# Patient Record
Sex: FEMALE | Race: WHITE | Employment: OTHER | URBAN - METROPOLITAN AREA
[De-identification: names, ages, dates, MRNs, and addresses within clinical notes are randomized per-mention and may not be internally consistent; named-entity substitution may affect disease eponyms.]

---

## 2017-02-07 PROBLEM — I25.10 CORONARY ARTERY DISEASE INVOLVING NATIVE CORONARY ARTERY OF NATIVE HEART: Chronic | Status: ACTIVE | Noted: 2017-02-07

## 2017-02-07 PROBLEM — Z95.820 S/P ANGIOPLASTY WITH STENT: Status: ACTIVE | Noted: 2017-02-07

## 2019-03-01 ENCOUNTER — HOSPITAL ENCOUNTER (OUTPATIENT)
Dept: CT IMAGING | Age: 59
Discharge: HOME | End: 2019-03-01
Attending: UROLOGY
Payer: COMMERCIAL

## 2019-03-01 DIAGNOSIS — R31.0 GROSS HEMATURIA: ICD-10-CM

## 2019-03-01 LAB — CREAT UR-MCNC: 0.6 MG/DL (ref 0.6–1.3)

## 2019-03-01 PROCEDURE — 74011636320 HC RX REV CODE- 636/320: Performed by: UROLOGY

## 2019-03-01 PROCEDURE — 74178 CT ABD&PLV WO CNTR FLWD CNTR: CPT

## 2019-03-01 PROCEDURE — 82565 ASSAY OF CREATININE: CPT

## 2019-03-01 RX ADMIN — IOPAMIDOL 100 ML: 755 INJECTION, SOLUTION INTRAVENOUS at 15:37

## 2020-04-13 ENCOUNTER — APPOINTMENT (OUTPATIENT)
Dept: GENERAL RADIOLOGY | Age: 60
End: 2020-04-13
Attending: EMERGENCY MEDICINE
Payer: COMMERCIAL

## 2020-04-13 ENCOUNTER — HOSPITAL ENCOUNTER (EMERGENCY)
Age: 60
Discharge: HOME | End: 2020-04-13
Attending: EMERGENCY MEDICINE
Payer: COMMERCIAL

## 2020-04-13 VITALS
DIASTOLIC BLOOD PRESSURE: 106 MMHG | OXYGEN SATURATION: 97 % | TEMPERATURE: 99 F | SYSTOLIC BLOOD PRESSURE: 195 MMHG | RESPIRATION RATE: 16 BRPM | HEART RATE: 62 BPM

## 2020-04-13 DIAGNOSIS — S82.041A CLOSED DISPLACED COMMINUTED FRACTURE OF RIGHT PATELLA, INITIAL ENCOUNTER: Primary | ICD-10-CM

## 2020-04-13 PROCEDURE — 74011250637 HC RX REV CODE- 250/637: Performed by: STUDENT IN AN ORGANIZED HEALTH CARE EDUCATION/TRAINING PROGRAM

## 2020-04-13 PROCEDURE — 93005 ELECTROCARDIOGRAM TRACING: CPT

## 2020-04-13 PROCEDURE — 73562 X-RAY EXAM OF KNEE 3: CPT

## 2020-04-13 PROCEDURE — 99284 EMERGENCY DEPT VISIT MOD MDM: CPT

## 2020-04-13 RX ORDER — ACETAMINOPHEN 500 MG
1000 TABLET ORAL
Status: COMPLETED | OUTPATIENT
Start: 2020-04-13 | End: 2020-04-13

## 2020-04-13 RX ADMIN — ACETAMINOPHEN 1000 MG: 500 TABLET ORAL at 21:22

## 2020-04-14 ENCOUNTER — ANESTHESIA EVENT (OUTPATIENT)
Dept: SURGERY | Age: 60
End: 2020-04-14
Payer: COMMERCIAL

## 2020-04-14 LAB
ATRIAL RATE: 71 BPM
CALCULATED P AXIS, ECG09: 20 DEGREES
CALCULATED R AXIS, ECG10: 35 DEGREES
CALCULATED T AXIS, ECG11: 72 DEGREES
DIAGNOSIS, 93000: NORMAL
P-R INTERVAL, ECG05: 136 MS
Q-T INTERVAL, ECG07: 388 MS
QRS DURATION, ECG06: 90 MS
QTC CALCULATION (BEZET), ECG08: 421 MS
VENTRICULAR RATE, ECG03: 71 BPM

## 2020-04-14 RX ORDER — ASPIRIN 81 MG/1
TABLET ORAL DAILY
COMMUNITY

## 2020-04-14 RX ORDER — ATORVASTATIN CALCIUM 20 MG/1
TABLET, FILM COATED ORAL DAILY
COMMUNITY

## 2020-04-14 NOTE — DISCHARGE INSTRUCTIONS
Do not eat anything after midnight tonight. Do not take your blood thinners tomorrow morning  Reports to the main hospital entrance at 8 am tomorrow, 4/14/20 and they will direct you to where to check in for outpatient surgery.

## 2020-04-14 NOTE — ED TRIAGE NOTES
Patient arrived via medics after falling while walking her dog and fell forward onto both of her knees.  Right knee pain

## 2020-04-14 NOTE — ED NOTES
Discharge instructions reviewed with patient.  Patient verbalized understanding of surgery tomorrow morning at 8am.

## 2020-04-14 NOTE — ED PROVIDER NOTES
EMERGENCY DEPARTMENT HISTORY AND PHYSICAL EXAM    10:08 PM      Date: 4/13/2020  Patient Name: Lola Diamond    History of Presenting Illness     Chief Complaint   Patient presents with   Waunita Favorite Fall    Knee Pain         History Provided By: Patient  Location/Duration/Severity/Modifying factors   Lola Diamond is a 62 y.o. female with a h/o CAD s/p stent placement and MVP, who presents to the ED complaining of left knee pain following fall 20 minutes prior to arrival. States that she tripped over uneven sidewalk and fell on her knees. States that she caught herself with her hands as well. Reports that her left knee is scrapped up, but her right knee is the one that hurts. Was not able to walk immediately following fall. PCP: Garland Nur MD    Current Outpatient Medications   Medication Sig Dispense Refill    trospium (SANCTURA) 20 mg tablet       atorvastatin (LIPITOR) 80 mg tablet Take 1 Tab by mouth nightly. 30 Tab 11    HYDROcodone-acetaminophen (NORCO) 5-325 mg per tablet Take 1-2 Tabs by mouth every four (4) hours as needed. Max Daily Amount: 12 Tabs. 60 Tab 0    METOPROLOL TARTRATE (LOPRESSOR PO) Take 25 mg by mouth two (2) times a day.  escitalopram oxalate (LEXAPRO) 5 mg tablet Take  by mouth daily.          Past History     Past Medical History:  Past Medical History:   Diagnosis Date    Anxiety     CAD (coronary artery disease)     ONE VESSEL STENT 3/2017    Cervical stenosis of spine     Coronary artery disease involving native coronary artery of native heart 2017    Deficiency anemia     Depression     Esophageal reflux     FOOD RELATED HEART BURN     Exercise tolerance finding 07/2018    WALKS 20 MINUTES 2 DAYS, HOUSE AND YARD WORK, CAN CLIMBS STAIRS AND LAY FLAT-- DENIES SOB OR CHEST PAINS    Fibromyalgia     Gross hematuria     HLD (hyperlipidemia)     Hypercholesteremia     Hypertension     Kidney stone     Mitral valve prolapse     NO PROBLEMS     PONV (postoperative nausea and vomiting)     Right kidney stone     S/P angioplasty with stent 2017    Cath 2/7/17, [facility] Peri Eckert[facility]    Smoker     Wears glasses        Past Surgical History:  Past Surgical History:   Procedure Laterality Date    HX CHOLECYSTECTOMY      HX CORONARY STENT PLACEMENT  03/2017    ONE VESSEL STENT    HX OTHER SURGICAL      SPINE SURG.  HX SHOULDER ARTHROSCOPY Right 07/2018    HX TUBAL LIGATION Bilateral     HX UROLOGICAL  05/10/2019    S/p cystoscopy, exchange of JJ stent, right URS, laser lithotripsy, and removal of stone by Dr. Skye Suarez at [facility]       Family History:  No family history on file. Social History:  Social History     Tobacco Use    Smoking status: Current Every Day Smoker     Packs/day: 1.00     Years: 30.00     Pack years: 30.00     Types: Cigarettes    Smokeless tobacco: Never Used   Substance Use Topics    Alcohol use: No    Drug use: No       Allergies: Allergies   Allergen Reactions    Oxycodone-Acetaminophen Other (comments) and Itching         Review of Systems       Review of Systems   Constitutional: Negative for fever. HENT: Negative for sore throat. Eyes: Negative for visual disturbance. Respiratory: Negative for cough and shortness of breath. Cardiovascular: Negative for chest pain and leg swelling. Gastrointestinal: Negative for abdominal pain, constipation, diarrhea, nausea and vomiting. Genitourinary: Negative for dysuria. Musculoskeletal: Positive for joint swelling. Skin: Positive for wound. Neurological: Negative for weakness and headaches. Physical Exam     Visit Vitals  BP (!) 195/106   Pulse 62   Temp 99 °F (37.2 °C)   Resp 16   LMP 01/05/2015   SpO2 97%       Physical Exam  Vitals signs and nursing note reviewed. Constitutional:       Appearance: She is not toxic-appearing. HENT:      Head: Normocephalic and atraumatic. Eyes:      General: No scleral icterus.      Conjunctiva/sclera: Conjunctivae normal. Cardiovascular:      Rate and Rhythm: Normal rate and regular rhythm. Pulses: Normal pulses. Heart sounds: Normal heart sounds. No murmur. Pulmonary:      Effort: Pulmonary effort is normal. No respiratory distress. Breath sounds: Normal breath sounds. Abdominal:      General: Abdomen is flat. Bowel sounds are normal. There is no distension. Palpations: Abdomen is soft. Tenderness: There is no abdominal tenderness. Musculoskeletal:      Comments: +right leg pain and medially swollen  Left knee abrasion   Skin:     General: Skin is warm and dry. Neurological:      Mental Status: She is alert. Mental status is at baseline. Diagnostic Study Results     Labs -  Recent Results (from the past 12 hour(s))   EKG, 12 LEAD, INITIAL    Collection Time: 04/13/20  8:30 PM   Result Value Ref Range    Ventricular Rate 71 BPM    Atrial Rate 71 BPM    P-R Interval 136 ms    QRS Duration 90 ms    Q-T Interval 388 ms    QTC Calculation (Bezet) 421 ms    Calculated P Axis 20 degrees    Calculated R Axis 35 degrees    Calculated T Axis 72 degrees    Diagnosis       Normal sinus rhythm  Normal ECG  When compared with ECG of 05-FEB-2015 10:59,  No significant change was found         Radiologic Studies -   XR KNEE RT 3 V   Final Result   Impression:   1. Comminuted and mildly displaced patellar fracture. Medical Decision Making   I am the first provider for this patient. I reviewed the vital signs, available nursing notes, past medical history, past surgical history, family history and social history. Vital Signs-Reviewed the patient's vital signs. Records Reviewed: Nursing Notes (Time of Review: 10:08 PM)    ED Course: Progress Notes, Reevaluation, and Consults:    ED Course as of Apr 13 2322 Mon Apr 13, 2020 2125 ATTESTATION:  Patient was seen with the resident physician. She is a 75-year-old female who presents via EMS following a fall.   Patient is complaining of right knee pain. Patient states that she was walking her dog and stepped off the curb and lost her balance. Patient fell and landed on her knees. Patient states that she is complaining of right knee pain, she was unable to ambulate after the fall. She does have an abrasion to the left knee. No numbness or tingling in the extremities. Patient did not hit her head or lose consciousness. No treatment prior to arrival.  On examination the patient is resting comfortably in bed. Clear breath sounds in all lung fields. Regular rate and rhythm with a heart. No abdominal tenderness palpation. She does have an abrasion to the left knee. There is swelling and tenderness of the right knee. Tenderness over the right patella. No sensory deficit. DP and PT +2 bilaterally. No focal neurological deficits. Paulette Serrano DO      [MS]   8414 Was counseled several times about being admitted to have surgery the morning. The patient does not want to be admitted to the hospital, conversation was had with the orthopedic surgeon. She is on the schedule for 10:00 AM, as long as she is n.p.o. after midnight she can report to the hospital at 8:00 AM tomorrow to have her surgery. The patient be discharged home with a knee immobilizer and crutches. She is agreeable to this plan. Orthopedic surgeon was updated. Paulette Serrano DO      [MS]      ED Course User Index  [MS] Ameya Abbott DO     Patient with right knee pain and tenderness to palpation. Holding it extended resting on a towel. Will check xray. Provider Notes (Medical Decision Making):   MDM   Xray shows comminuted and mildly displaced patellar fracture    Spoke with Dr. Jessica Sanchez, recommend admission for surgery tomorrow. Patient does not want to be admitted, would prefer to go home to schedule outpatient surgery. Spoke with Dr. Jessica Sanchez. Will discharge home for outpatient surgery tomorrow, 4/14/20.  Patient is to come at 8 am. NPO at midnight. Hold aspirin in the morning. Procedures none        Diagnosis     Clinical Impression:   1. Closed displaced comminuted fracture of right patella, initial encounter        Disposition: home    Follow-up Information     Follow up With Specialties Details Why Contact Info    Cassandra Dang MD Family Practice In 1 week  [place] [address]  [phone]             Patient's Medications   Start Taking    No medications on file   Continue Taking    ATORVASTATIN (LIPITOR) 80 MG TABLET    Take 1 Tab by mouth nightly. ESCITALOPRAM OXALATE (LEXAPRO) 5 MG TABLET    Take  by mouth daily. HYDROCODONE-ACETAMINOPHEN (NORCO) 5-325 MG PER TABLET    Take 1-2 Tabs by mouth every four (4) hours as needed. Max Daily Amount: 12 Tabs. METOPROLOL TARTRATE (LOPRESSOR PO)    Take 25 mg by mouth two (2) times a day. TROSPIUM (SANCTURA) 20 MG TABLET       These Medications have changed    No medications on file   Stop Taking    No medications on file     Disclaimer: Sections of this note are dictated using utilizing voice recognition software. Minor typographical errors may be present. If questions arise, please do not hesitate to contact me or call our department.       Ravinder Mota MD, PGY-1  [facility] Medicine

## 2020-04-14 NOTE — CONSULTS
Displaced R patella fx  Fabian Valencia while walking her dog today  H/O CAD + stent  Hold ASA  Admit to hospitalist  NPO after MN  To OR in AM for ORIF  Full note to follow

## 2020-04-15 ENCOUNTER — HOSPITAL ENCOUNTER (OUTPATIENT)
Age: 60
Discharge: HOME HEALTH | End: 2020-04-16
Attending: SPECIALIST | Admitting: SPECIALIST
Payer: COMMERCIAL

## 2020-04-15 ENCOUNTER — ANESTHESIA (OUTPATIENT)
Dept: SURGERY | Age: 60
End: 2020-04-15
Payer: COMMERCIAL

## 2020-04-15 ENCOUNTER — APPOINTMENT (OUTPATIENT)
Dept: GENERAL RADIOLOGY | Age: 60
End: 2020-04-15
Attending: SPECIALIST
Payer: COMMERCIAL

## 2020-04-15 DIAGNOSIS — G89.18 ACUTE POST-OPERATIVE PAIN: Primary | ICD-10-CM

## 2020-04-15 PROBLEM — M79.7 FIBROMYALGIA: Chronic | Status: ACTIVE | Noted: 2020-04-15

## 2020-04-15 PROBLEM — S82.009A PATELLA FRACTURE: Status: ACTIVE | Noted: 2020-04-15

## 2020-04-15 PROBLEM — S82.031A DISPLACED TRANSVERSE FRACTURE OF RIGHT PATELLA, INITIAL ENCOUNTER FOR CLOSED FRACTURE: Status: ACTIVE | Noted: 2020-04-15

## 2020-04-15 LAB — GLUCOSE BLD STRIP.AUTO-MCNC: 129 MG/DL (ref 70–110)

## 2020-04-15 PROCEDURE — 77030008462 HC STPLR SKN PROX J&J -A: Performed by: SPECIALIST

## 2020-04-15 PROCEDURE — 82962 GLUCOSE BLOOD TEST: CPT

## 2020-04-15 PROCEDURE — 76210000006 HC OR PH I REC 0.5 TO 1 HR: Performed by: SPECIALIST

## 2020-04-15 PROCEDURE — 64447 NJX AA&/STRD FEMORAL NRV IMG: CPT | Performed by: ANESTHESIOLOGY

## 2020-04-15 PROCEDURE — 77030003029 HC SUT VCRL J&J -B: Performed by: SPECIALIST

## 2020-04-15 PROCEDURE — 74011250637 HC RX REV CODE- 250/637: Performed by: NURSE ANESTHETIST, CERTIFIED REGISTERED

## 2020-04-15 PROCEDURE — 77030010509 HC AIRWY LMA MSK TELE -A: Performed by: ANESTHESIOLOGY

## 2020-04-15 PROCEDURE — 73560 X-RAY EXAM OF KNEE 1 OR 2: CPT

## 2020-04-15 PROCEDURE — 76010000153 HC OR TIME 1.5 TO 2 HR: Performed by: SPECIALIST

## 2020-04-15 PROCEDURE — 77030013708 HC HNDPC SUC IRR PULS STRY –B: Performed by: SPECIALIST

## 2020-04-15 PROCEDURE — 77030013480 HC CUF TRNQT J&J -B: Performed by: SPECIALIST

## 2020-04-15 PROCEDURE — 76060000034 HC ANESTHESIA 1.5 TO 2 HR: Performed by: SPECIALIST

## 2020-04-15 PROCEDURE — 74011250636 HC RX REV CODE- 250/636: Performed by: NURSE ANESTHETIST, CERTIFIED REGISTERED

## 2020-04-15 PROCEDURE — 74011250636 HC RX REV CODE- 250/636

## 2020-04-15 PROCEDURE — 74011000250 HC RX REV CODE- 250: Performed by: NURSE ANESTHETIST, CERTIFIED REGISTERED

## 2020-04-15 PROCEDURE — 99218 HC RM OBSERVATION: CPT

## 2020-04-15 PROCEDURE — 74011250637 HC RX REV CODE- 250/637: Performed by: SPECIALIST

## 2020-04-15 PROCEDURE — 77030040922 HC BLNKT HYPOTHRM STRY -A: Performed by: SPECIALIST

## 2020-04-15 PROCEDURE — 74011250637 HC RX REV CODE- 250/637: Performed by: PHYSICIAN ASSISTANT

## 2020-04-15 PROCEDURE — C1769 GUIDE WIRE: HCPCS | Performed by: SPECIALIST

## 2020-04-15 PROCEDURE — 77030018836 HC SOL IRR NACL ICUM -A: Performed by: SPECIALIST

## 2020-04-15 PROCEDURE — 74011250636 HC RX REV CODE- 250/636: Performed by: PHYSICIAN ASSISTANT

## 2020-04-15 PROCEDURE — 77030016458 HC BIT DRL CANN1 SYNT -F: Performed by: SPECIALIST

## 2020-04-15 PROCEDURE — 74011250636 HC RX REV CODE- 250/636: Performed by: ANESTHESIOLOGY

## 2020-04-15 PROCEDURE — 77030018673: Performed by: SPECIALIST

## 2020-04-15 PROCEDURE — 74011250636 HC RX REV CODE- 250/636: Performed by: SPECIALIST

## 2020-04-15 PROCEDURE — 77030040361 HC SLV COMPR DVT MDII -B: Performed by: SPECIALIST

## 2020-04-15 PROCEDURE — C1713 ANCHOR/SCREW BN/BN,TIS/BN: HCPCS | Performed by: SPECIALIST

## 2020-04-15 PROCEDURE — 77030003601 HC NDL NRV BLK BBMI -A: Performed by: SPECIALIST

## 2020-04-15 PROCEDURE — 74011250637 HC RX REV CODE- 250/637: Performed by: ANESTHESIOLOGY

## 2020-04-15 PROCEDURE — 77030031139 HC SUT VCRL2 J&J -A: Performed by: SPECIALIST

## 2020-04-15 DEVICE — CABLE SURG L750MM DIA1MM S STL SMOOTH W/ CRMP: Type: IMPLANTABLE DEVICE | Site: KNEE | Status: FUNCTIONAL

## 2020-04-15 DEVICE — SCREW BNE L34MM DIA4MM S STL CANN LNG HALF THRD SM HEX SOCK: Type: IMPLANTABLE DEVICE | Site: KNEE | Status: FUNCTIONAL

## 2020-04-15 DEVICE — SCREW BNE L38MM DIA4MM S STL CANN LNG HALF THRD SM HEX SOCK: Type: IMPLANTABLE DEVICE | Site: KNEE | Status: FUNCTIONAL

## 2020-04-15 RX ORDER — LIDOCAINE HYDROCHLORIDE 20 MG/ML
INJECTION, SOLUTION EPIDURAL; INFILTRATION; INTRACAUDAL; PERINEURAL AS NEEDED
Status: DISCONTINUED | OUTPATIENT
Start: 2020-04-15 | End: 2020-04-15 | Stop reason: HOSPADM

## 2020-04-15 RX ORDER — ESCITALOPRAM OXALATE 10 MG/1
10 TABLET ORAL DAILY
Status: DISCONTINUED | OUTPATIENT
Start: 2020-04-16 | End: 2020-04-16 | Stop reason: HOSPADM

## 2020-04-15 RX ORDER — MIDAZOLAM HYDROCHLORIDE 1 MG/ML
INJECTION, SOLUTION INTRAMUSCULAR; INTRAVENOUS AS NEEDED
Status: DISCONTINUED | OUTPATIENT
Start: 2020-04-15 | End: 2020-04-15 | Stop reason: HOSPADM

## 2020-04-15 RX ORDER — SODIUM CHLORIDE, SODIUM LACTATE, POTASSIUM CHLORIDE, CALCIUM CHLORIDE 600; 310; 30; 20 MG/100ML; MG/100ML; MG/100ML; MG/100ML
25 INJECTION, SOLUTION INTRAVENOUS CONTINUOUS
Status: DISCONTINUED | OUTPATIENT
Start: 2020-04-15 | End: 2020-04-15 | Stop reason: HOSPADM

## 2020-04-15 RX ORDER — ROPIVACAINE HYDROCHLORIDE 5 MG/ML
INJECTION, SOLUTION EPIDURAL; INFILTRATION; PERINEURAL
Status: COMPLETED
Start: 2020-04-15 | End: 2020-04-15

## 2020-04-15 RX ORDER — SCOLOPAMINE TRANSDERMAL SYSTEM 1 MG/1
1 PATCH, EXTENDED RELEASE TRANSDERMAL ONCE
Status: COMPLETED | OUTPATIENT
Start: 2020-04-15 | End: 2020-04-16

## 2020-04-15 RX ORDER — FAMOTIDINE 20 MG/1
20 TABLET, FILM COATED ORAL ONCE
Status: COMPLETED | OUTPATIENT
Start: 2020-04-15 | End: 2020-04-15

## 2020-04-15 RX ORDER — LABETALOL HCL 20 MG/4 ML
10 SYRINGE (ML) INTRAVENOUS
Status: DISCONTINUED | OUTPATIENT
Start: 2020-04-15 | End: 2020-04-15 | Stop reason: HOSPADM

## 2020-04-15 RX ORDER — SODIUM CHLORIDE 0.9 % (FLUSH) 0.9 %
5-40 SYRINGE (ML) INJECTION EVERY 8 HOURS
Status: DISCONTINUED | OUTPATIENT
Start: 2020-04-15 | End: 2020-04-16 | Stop reason: HOSPADM

## 2020-04-15 RX ORDER — ATORVASTATIN CALCIUM 20 MG/1
20 TABLET, FILM COATED ORAL DAILY
Status: DISCONTINUED | OUTPATIENT
Start: 2020-04-16 | End: 2020-04-16 | Stop reason: HOSPADM

## 2020-04-15 RX ORDER — DEXTROSE, SODIUM CHLORIDE, SODIUM LACTATE, POTASSIUM CHLORIDE, AND CALCIUM CHLORIDE 5; .6; .31; .03; .02 G/100ML; G/100ML; G/100ML; G/100ML; G/100ML
75 INJECTION, SOLUTION INTRAVENOUS CONTINUOUS
Status: DISCONTINUED | OUTPATIENT
Start: 2020-04-15 | End: 2020-04-16 | Stop reason: HOSPADM

## 2020-04-15 RX ORDER — NALOXONE HYDROCHLORIDE 0.4 MG/ML
0.4 INJECTION, SOLUTION INTRAMUSCULAR; INTRAVENOUS; SUBCUTANEOUS AS NEEDED
Status: DISCONTINUED | OUTPATIENT
Start: 2020-04-15 | End: 2020-04-16 | Stop reason: HOSPADM

## 2020-04-15 RX ORDER — ACETAMINOPHEN 325 MG/1
650 TABLET ORAL EVERY 6 HOURS
Status: DISCONTINUED | OUTPATIENT
Start: 2020-04-15 | End: 2020-04-16 | Stop reason: HOSPADM

## 2020-04-15 RX ORDER — MIDAZOLAM HYDROCHLORIDE 1 MG/ML
2 INJECTION, SOLUTION INTRAMUSCULAR; INTRAVENOUS ONCE
Status: DISCONTINUED | OUTPATIENT
Start: 2020-04-15 | End: 2020-04-15 | Stop reason: HOSPADM

## 2020-04-15 RX ORDER — SODIUM CHLORIDE, SODIUM LACTATE, POTASSIUM CHLORIDE, CALCIUM CHLORIDE 600; 310; 30; 20 MG/100ML; MG/100ML; MG/100ML; MG/100ML
125 INJECTION, SOLUTION INTRAVENOUS CONTINUOUS
Status: DISCONTINUED | OUTPATIENT
Start: 2020-04-15 | End: 2020-04-15 | Stop reason: HOSPADM

## 2020-04-15 RX ORDER — PREGABALIN 75 MG/1
75 CAPSULE ORAL
Status: COMPLETED | OUTPATIENT
Start: 2020-04-15 | End: 2020-04-15

## 2020-04-15 RX ORDER — PROPOFOL 10 MG/ML
INJECTION, EMULSION INTRAVENOUS AS NEEDED
Status: DISCONTINUED | OUTPATIENT
Start: 2020-04-15 | End: 2020-04-15 | Stop reason: HOSPADM

## 2020-04-15 RX ORDER — ZOLPIDEM TARTRATE 5 MG/1
5 TABLET ORAL
Status: DISCONTINUED | OUTPATIENT
Start: 2020-04-15 | End: 2020-04-16 | Stop reason: HOSPADM

## 2020-04-15 RX ORDER — HYDROMORPHONE HYDROCHLORIDE 1 MG/ML
1 INJECTION, SOLUTION INTRAMUSCULAR; INTRAVENOUS; SUBCUTANEOUS
Status: DISCONTINUED | OUTPATIENT
Start: 2020-04-15 | End: 2020-04-16 | Stop reason: HOSPADM

## 2020-04-15 RX ORDER — ONDANSETRON 2 MG/ML
4 INJECTION INTRAMUSCULAR; INTRAVENOUS ONCE
Status: COMPLETED | OUTPATIENT
Start: 2020-04-15 | End: 2020-04-15

## 2020-04-15 RX ORDER — FENTANYL CITRATE 50 UG/ML
INJECTION, SOLUTION INTRAMUSCULAR; INTRAVENOUS AS NEEDED
Status: DISCONTINUED | OUTPATIENT
Start: 2020-04-15 | End: 2020-04-15 | Stop reason: HOSPADM

## 2020-04-15 RX ORDER — ONDANSETRON 2 MG/ML
4 INJECTION INTRAMUSCULAR; INTRAVENOUS
Status: DISCONTINUED | OUTPATIENT
Start: 2020-04-15 | End: 2020-04-16 | Stop reason: HOSPADM

## 2020-04-15 RX ORDER — LIDOCAINE HYDROCHLORIDE 10 MG/ML
0.1 INJECTION, SOLUTION EPIDURAL; INFILTRATION; INTRACAUDAL; PERINEURAL AS NEEDED
Status: DISCONTINUED | OUTPATIENT
Start: 2020-04-15 | End: 2020-04-15 | Stop reason: HOSPADM

## 2020-04-15 RX ORDER — FENTANYL CITRATE 50 UG/ML
50 INJECTION, SOLUTION INTRAMUSCULAR; INTRAVENOUS AS NEEDED
Status: DISCONTINUED | OUTPATIENT
Start: 2020-04-15 | End: 2020-04-15 | Stop reason: HOSPADM

## 2020-04-15 RX ORDER — CEFAZOLIN SODIUM 2 G/50ML
2 SOLUTION INTRAVENOUS EVERY 8 HOURS
Status: COMPLETED | OUTPATIENT
Start: 2020-04-15 | End: 2020-04-16

## 2020-04-15 RX ORDER — LABETALOL HCL 20 MG/4 ML
SYRINGE (ML) INTRAVENOUS
Status: COMPLETED
Start: 2020-04-15 | End: 2020-04-15

## 2020-04-15 RX ORDER — METOPROLOL TARTRATE 50 MG/1
50 TABLET ORAL 2 TIMES DAILY
Status: DISCONTINUED | OUTPATIENT
Start: 2020-04-15 | End: 2020-04-16 | Stop reason: HOSPADM

## 2020-04-15 RX ORDER — MIDAZOLAM HYDROCHLORIDE 1 MG/ML
INJECTION, SOLUTION INTRAMUSCULAR; INTRAVENOUS
Status: COMPLETED
Start: 2020-04-15 | End: 2020-04-15

## 2020-04-15 RX ORDER — FENTANYL CITRATE 50 UG/ML
INJECTION, SOLUTION INTRAMUSCULAR; INTRAVENOUS
Status: DISCONTINUED
Start: 2020-04-15 | End: 2020-04-15 | Stop reason: WASHOUT

## 2020-04-15 RX ORDER — CEFAZOLIN SODIUM 2 G/50ML
2 SOLUTION INTRAVENOUS
Status: COMPLETED | OUTPATIENT
Start: 2020-04-15 | End: 2020-04-15

## 2020-04-15 RX ORDER — ROPIVACAINE HYDROCHLORIDE 5 MG/ML
30 INJECTION, SOLUTION EPIDURAL; INFILTRATION; PERINEURAL
Status: DISCONTINUED | OUTPATIENT
Start: 2020-04-15 | End: 2020-04-15 | Stop reason: HOSPADM

## 2020-04-15 RX ORDER — MIDAZOLAM HYDROCHLORIDE 1 MG/ML
INJECTION, SOLUTION INTRAMUSCULAR; INTRAVENOUS
Status: COMPLETED | OUTPATIENT
Start: 2020-04-15 | End: 2020-04-15

## 2020-04-15 RX ORDER — HYDROCODONE BITARTRATE AND ACETAMINOPHEN 7.5; 325 MG/1; MG/1
1 TABLET ORAL
Status: DISCONTINUED | OUTPATIENT
Start: 2020-04-15 | End: 2020-04-16 | Stop reason: HOSPADM

## 2020-04-15 RX ORDER — CEFAZOLIN SODIUM 1 G/3ML
INJECTION, POWDER, FOR SOLUTION INTRAMUSCULAR; INTRAVENOUS AS NEEDED
Status: DISCONTINUED | OUTPATIENT
Start: 2020-04-15 | End: 2020-04-15 | Stop reason: HOSPADM

## 2020-04-15 RX ORDER — SODIUM CHLORIDE 0.9 % (FLUSH) 0.9 %
5-40 SYRINGE (ML) INJECTION AS NEEDED
Status: DISCONTINUED | OUTPATIENT
Start: 2020-04-15 | End: 2020-04-16 | Stop reason: HOSPADM

## 2020-04-15 RX ORDER — ROPIVACAINE HYDROCHLORIDE 5 MG/ML
INJECTION, SOLUTION EPIDURAL; INFILTRATION; PERINEURAL
Status: COMPLETED | OUTPATIENT
Start: 2020-04-15 | End: 2020-04-15

## 2020-04-15 RX ORDER — EPHEDRINE SULFATE/0.9% NACL/PF 50 MG/5 ML
SYRINGE (ML) INTRAVENOUS AS NEEDED
Status: DISCONTINUED | OUTPATIENT
Start: 2020-04-15 | End: 2020-04-15 | Stop reason: HOSPADM

## 2020-04-15 RX ORDER — ONDANSETRON 2 MG/ML
INJECTION INTRAMUSCULAR; INTRAVENOUS AS NEEDED
Status: DISCONTINUED | OUTPATIENT
Start: 2020-04-15 | End: 2020-04-15 | Stop reason: HOSPADM

## 2020-04-15 RX ORDER — ACETAMINOPHEN 325 MG/1
650 TABLET ORAL
Status: COMPLETED | OUTPATIENT
Start: 2020-04-15 | End: 2020-04-15

## 2020-04-15 RX ORDER — AMOXICILLIN 250 MG
1 CAPSULE ORAL 2 TIMES DAILY
Status: DISCONTINUED | OUTPATIENT
Start: 2020-04-15 | End: 2020-04-16 | Stop reason: HOSPADM

## 2020-04-15 RX ADMIN — CEFAZOLIN SODIUM 2 G: 2 SOLUTION INTRAVENOUS at 18:35

## 2020-04-15 RX ADMIN — ACETAMINOPHEN 650 MG: 325 TABLET ORAL at 10:43

## 2020-04-15 RX ADMIN — ACETAMINOPHEN 650 MG: 325 TABLET ORAL at 23:31

## 2020-04-15 RX ADMIN — ONDANSETRON 4 MG: 2 INJECTION INTRAMUSCULAR; INTRAVENOUS at 11:40

## 2020-04-15 RX ADMIN — ONDANSETRON 4 MG: 2 INJECTION INTRAMUSCULAR; INTRAVENOUS at 13:10

## 2020-04-15 RX ADMIN — PROPOFOL 150 MG: 10 INJECTION, EMULSION INTRAVENOUS at 11:28

## 2020-04-15 RX ADMIN — ACETAMINOPHEN 650 MG: 325 TABLET ORAL at 18:35

## 2020-04-15 RX ADMIN — METOPROLOL TARTRATE 50 MG: 50 TABLET, FILM COATED ORAL at 18:34

## 2020-04-15 RX ADMIN — PREGABALIN 75 MG: 75 CAPSULE ORAL at 11:00

## 2020-04-15 RX ADMIN — ROPIVACAINE HYDROCHLORIDE 150 MG: 150 INJECTION, SOLUTION EPIDURAL; INFILTRATION; PERINEURAL at 11:14

## 2020-04-15 RX ADMIN — MIDAZOLAM HYDROCHLORIDE 2 MG: 2 INJECTION, SOLUTION INTRAMUSCULAR; INTRAVENOUS at 11:22

## 2020-04-15 RX ADMIN — SODIUM CHLORIDE, SODIUM LACTATE, POTASSIUM CHLORIDE, CALCIUM CHLORIDE, AND DEXTROSE MONOHYDRATE 75 ML/HR: 600; 310; 30; 20; 5 INJECTION, SOLUTION INTRAVENOUS at 18:36

## 2020-04-15 RX ADMIN — SODIUM CHLORIDE, SODIUM LACTATE, POTASSIUM CHLORIDE, AND CALCIUM CHLORIDE 25 ML/HR: 600; 310; 30; 20 INJECTION, SOLUTION INTRAVENOUS at 10:43

## 2020-04-15 RX ADMIN — FENTANYL CITRATE 25 MCG: 50 INJECTION INTRAMUSCULAR; INTRAVENOUS at 11:48

## 2020-04-15 RX ADMIN — CEFAZOLIN SODIUM 2 G: 2 SOLUTION INTRAVENOUS at 23:31

## 2020-04-15 RX ADMIN — LABETALOL 20 MG/4 ML (5 MG/ML) INTRAVENOUS SYRINGE 10 MG: at 13:10

## 2020-04-15 RX ADMIN — HYDROCODONE BITARTRATE AND ACETAMINOPHEN 1 TABLET: 7.5; 325 TABLET ORAL at 21:16

## 2020-04-15 RX ADMIN — FAMOTIDINE 20 MG: 20 TABLET ORAL at 10:43

## 2020-04-15 RX ADMIN — MIDAZOLAM 2 MG: 1 INJECTION INTRAMUSCULAR; INTRAVENOUS at 11:14

## 2020-04-15 RX ADMIN — SODIUM CHLORIDE 1000 MG: 900 INJECTION, SOLUTION INTRAVENOUS at 10:43

## 2020-04-15 RX ADMIN — MIDAZOLAM HYDROCHLORIDE 2 MG: 2 INJECTION, SOLUTION INTRAMUSCULAR; INTRAVENOUS at 11:14

## 2020-04-15 RX ADMIN — FENTANYL CITRATE 75 MCG: 50 INJECTION INTRAMUSCULAR; INTRAVENOUS at 12:32

## 2020-04-15 RX ADMIN — Medication 10 MG: at 12:08

## 2020-04-15 RX ADMIN — Medication 10 MG: at 13:10

## 2020-04-15 RX ADMIN — LIDOCAINE HYDROCHLORIDE 50 MG: 20 INJECTION, SOLUTION EPIDURAL; INFILTRATION; INTRACAUDAL; PERINEURAL at 11:28

## 2020-04-15 RX ADMIN — Medication 10 MG: at 11:58

## 2020-04-15 RX ADMIN — ROPIVACAINE HYDROCHLORIDE 30 ML: 5 INJECTION, SOLUTION EPIDURAL; INFILTRATION; PERINEURAL at 11:14

## 2020-04-15 RX ADMIN — CEFAZOLIN 2 G: 10 INJECTION, POWDER, FOR SOLUTION INTRAVENOUS at 11:35

## 2020-04-15 NOTE — PERIOP NOTES
TRANSFER - OUT REPORT:    Verbal report given to Shanika ALEXANDER(name) on Shiela Bernal  being transferred to [address] [place] [address](unit) for routine post - op       Report consisted of patients Situation, Background, Assessment and   Recommendations(SBAR). Information from the following report(s) SBAR, OR Summary, Procedure Summary, Intake/Output and MAR was reviewed with the receiving nurse. Lines:   Peripheral IV 04/15/20 Left Hand (Active)   Site Assessment Clean, dry, & intact 4/15/2020  1:01 PM   Phlebitis Assessment 0 4/15/2020  1:01 PM   Infiltration Assessment 0 4/15/2020  1:01 PM   Dressing Status Clean, dry, & intact 4/15/2020  1:01 PM   Dressing Type Transparent;Tape 4/15/2020  1:01 PM   Hub Color/Line Status Pink; Infusing 4/15/2020  1:01 PM   Alcohol Cap Used No 4/15/2020 10:41 AM        Opportunity for questions and clarification was provided.       Patient transported with:   O2 @ 3 liters  Registered Nurse

## 2020-04-15 NOTE — ANESTHESIA PROCEDURE NOTES
Peripheral Block    Start time: 4/15/2020 11:10 AM  End time: 4/15/2020 11:21 AM  Performed by: Katelynn Stout MD  Authorized by: Katelynn Stout MD       Pre-procedure:    Indications: at surgeon's request and post-op pain management    Preanesthetic Checklist: patient identified, risks and benefits discussed, site marked, timeout performed, anesthesia consent given and patient being monitored    Timeout Time: 11:10          Block Type:   Block Type:  Femoral single shot  Laterality:  Right  Monitoring:  Standard ASA monitoring, continuous pulse ox, frequent vital sign checks, heart rate, responsive to questions and oxygen  Injection Technique:  Single shot  Procedures: nerve stimulator    Patient Position: supine  Prep: chlorhexidine    Location:  Upper thigh  Needle Type:  Stimuplex  Needle Localization:  Anatomical landmarks and nerve stimulator  Motor Response: minimal motor response >0.4 mA    Motor Response comment:  Produced patella motor response 1.60 mA to 0.32 mA and NO pain on injection    Assessment:  Number of attempts:  1  Injection Assessment:  Incremental injection every 5 mL, no paresthesia, negative aspiration for blood and no intravascular symptoms  Patient tolerance:  Patient tolerated the procedure well with no immediate complications

## 2020-04-15 NOTE — PROGRESS NOTES
Bedside shift change report given to Cynthia De Santiago (oncoming nurse) by Sterling Chairez (offgoing nurse). Report included the following information SBAR, Kardex, Procedure Summary, Intake/Output and MAR.

## 2020-04-15 NOTE — BRIEF OP NOTE
Brief Postoperative Note    Patient: Navid Hardy  YOB: 1960  MRN: [id number]    Date of Procedure: 4/15/2020     Pre-Op Diagnosis: Displaced right patella fracture    Post-Op Diagnosis: Same as preoperative diagnosis. Procedure(s):  RIGHT PATELLA OPEN REDUCTION INTERNAL FIXATION/C-ARM/SYNTHES    Surgeon(s):  Colby Bobby MD    Surgical Assistant: Nicole Herrera    Anesthesia: General     Estimated Blood Loss (mL): Minimal    Complications: None    Specimens: * No specimens in log *     Implants:   Implant Name Type Inv.  Item Serial No.  Lot No. LRB No. Used Action   CABLE W CRIMP [id number] SS -- STRL - [id number]  CABLE W CRIMP [id number] SS -- [place] [id number] Right 1 Implanted   SCR BNE SAVANNAH LNG THRD 4X38MM -- SS - [id number]  SCR BNE SAVANNAH LNG THRD [id number] -- SS  SY[employer]TH[employer] [place] [id number] Right 1 Implanted   SCR BNE SAVANNAH LNG THRD 4X34MM -- SS - [id number]  SCR BNE SAVANNAH LNG THRD 4X34MM -- SS  SYNTHES [place] [id number] Right 1 Implanted       Drains: * No LDAs found *    Findings: above    Electronically Signed by Nathalia Mariee MD on 4/15/2020 at 12:40 PM

## 2020-04-15 NOTE — H&P (VIEW-ONLY)
Consult/History & Physical 
 
Patient: Marquez Gaitan MRN: [id number]  SSN: [id number] YOB: 1960  Age: 62 y.o. Sex: female Subjective:  
  
Marquez Gaitan is a 62 y.o. female who is being seen for a right patella fracture. Pt fell 2 days ago while walking her labrador retriever dog. She caught her toe on an uneven sidewalk and fell forward landing on her anterior right knee. She felt immediate pain and developed swelling. X rays in ED revealed a displaced transverse right patella fracture. Ms. Honey Grande is  living in [place] with her granddaughter and a friend. She is a retired grocery  and . She takes ASA s/p coronary artery stent. Past Medical History:  
Diagnosis Date  Anxiety  Arthritis  CAD (coronary artery disease) ONE VESSEL STENT 3/2017 [facility] Cervical stenosis of spine  Coronary artery disease involving native coronary artery of native heart 2017  Deficiency anemia  Depression  Diabetes ([facility] [address] Diet controlled (per pt.)  Esophageal reflux FOOD RELATED HEART BURN   
 Exercise tolerance finding 07/2018 WALKS 20 MINUTES 2 DAYS, HOUSE AND YARD WORK, CAN CLIMBS STAIRS AND LAY FLAT-- DENIES SOB OR CHEST PAINS  Fibromyalgia  GERD (gastroesophageal reflux disease)  Gross hematuria  HLD (hyperlipidemia)  Hypercholesteremia  Hypertension  Kidney stone  Mitral valve prolapse NO PROBLEMS   
 PONV (postoperative nausea and vomiting)  Right kidney stone  S/P angioplasty with stent 2017 Cath 2/7/17, Dr. Marylu Whitfield, [facility]  Smoker  Wears glasses Past Surgical History:  
Procedure Laterality Date  HX CHOLECYSTECTOMY  HX CORONARY STENT PLACEMENT  03/2017 ONE VESSEL STENT  
 HX OTHER SURGICAL SPINE LO cervical spine  HX SHOULDER ARTHROSCOPY Right 07/2018  HX TUBAL LIGATION Bilateral   
 HX UROLOGICAL  05/10/2019 S/p cystoscopy, exchange of JJ stent, right URS, laser lithotripsy, and removal of stone by Dr. Fransico Guerra at [facility] History reviewed. No pertinent family history. Social History Tobacco Use  Smoking status: Current Every Day Smoker Packs/day: 1.00 Years: 30.00 Pack years: 30.00 Types: Cigarettes  Smokeless tobacco: Never Used Substance Use Topics  Alcohol use: No  
  
Current Facility-Administered Medications Medication Dose Route Frequency Provider Last Rate Last Dose  lidocaine (PF) (XYLOCAINE) 10 mg/mL (1 %) injection 0.1 mL  0.1 mL SubCUTAneous PRN Dre Dominguez CRNA  lactated Ringers infusion  25 mL/hr IntraVENous CONTINUOUS Dre Dominguez CRNA 25 mL/hr at 04/15/20 1043 25 mL/hr at 04/15/20 1043  ceFAZolin (ANCEF) 2g IVPB in 50 mL D5W  2 g IntraVENous ON CALL TO OR Gage Carlton PA-C      
 vancomycin (VANCOCIN) 1,000 mg in 0.9% sodium chloride (MBP/ADV) 250 mL adv  1,000 mg IntraVENous ON CALL TO OR Gage Carlton PA-C 125 mL/hr at 04/15/20 1043 1,000 mg at 04/15/20 1043  tranexamic acid (CYKLOKAPRON) 1,000 mg in 0.9% sodium chloride 110 mL IVPB  1 g IntraVENous ONCE BrGage bolden PA-C      
 scopolamine (TRANSDERM-SCOP) 1 mg over 3 days 1 Patch  1 Patch TransDERmal Ofe Costello MD   1 Patch at 04/15/20 1054  fentaNYL citrate (PF) 50 mcg/mL injection  midazolam (VERSED) 1 mg/mL injection Allergies Allergen Reactions  Oxycodone-Acetaminophen Other (comments) and Itching Review of Systems: A comprehensive review of systems was negative except for that written in the History of Present Illness. Objective:  
 
Vitals:  
 04/14/20 0828 04/15/20 1036 BP:  173/72 Pulse:  60 Resp:  20 Temp:  97.9 °F (36.6 °C) SpO2:  99% Weight: 145 lb (65.8 kg) 145 lb (65.8 kg) Height: 5' 2\" (1.575 m) 5' 2\" (1.575 m) Physical Exam: General:  Alert, cooperative, no distress, appears stated age. Eyes:  Conjunctivae/corneas clear. PERRL, EOMs intact. Fundi benign Ears:  Normal TMs and external ear canals both ears. Nose: Nares normal. Septum midline. Mucosa normal. No drainage or sinus tenderness. Mouth/Throat: Lips, mucosa, and tongue normal. Teeth and gums normal.  
Neck: Supple, symmetrical, trachea midline, no adenopathy, thyroid: no enlargment/tenderness/nodules, no carotid bruit and no JVD. Back:   Symmetric, no curvature. ROM normal. No CVA tenderness. Lungs:   Clear to auscultation bilaterally. Heart:  Regular rate and rhythm, S1, S2 normal, no murmur, click, rub or gallop. Abdomen:   Soft, non-tender. Bowel sounds normal. No masses,  No organomegaly. Extremities: Anterior right knee swelling 3+ effusion ROM limited and painful R knee No abrasions or lacerations Pulses: 2+ and symmetric all extremities. Skin: Skin color, texture, turgor normal. No rashes or lesions Lymph nodes: Cervical, supraclavicular, and axillary nodes normal.  
Neurologic: CNII-XII intact. Normal strength, sensation and reflexes throughout. XR RIGHT KNEE [facility] 4/13/20 
  
Findings: Alignment is near-anatomic. There is a moderately comminuted and 
mildly displaced patellar fracture. There is no other definite acute fracture 
evident. Small lipohemarthrosis. 
  
IMPRESSION Impression: 1. Comminuted and mildly displaced patellar fracture Assessment:  
 
Hospital Problems  Date Reviewed: 4/15/2020 Codes Class Noted POA Displaced transverse fracture of right patella, initial encounter for closed fracture ICD-10-CM: S82.031A ICD-9-CM: 822.0  4/15/2020 Unknown Fibromyalgia (Chronic) ICD-10-CM: M79.7 ICD-9-CM: 729.1  4/15/2020 Unknown Coronary artery disease involving native coronary artery of native heart (Chronic) ICD-10-CM: I25.10 ICD-9-CM: 414.01  2/7/2017 Yes S/P angioplasty with stent ICD-10-CM: Z95.820 ICD-9-CM: V45.89  2/7/2017 Yes Overview Signed 2/7/2017  4:04 PM by Michele Hernandez PA-C Cath 2/7/17, Dr. Merline Lawton, [facility]: 
1. Single vessel CAD with abnormal FFR of 0.64 2. Successful PTCA and PCI of LCX with 2.5 x 20 mm Synergy drug-eluting stent postdilated to 2.65 mm proximally tapering to 2.62 mm distally Plan: To OR for ORIF Risks of surgery outlined and informed consent obtained. Femoral nerve block Signed By: Noel Zaldivar MD   
 April 15, 2020

## 2020-04-15 NOTE — PROGRESS NOTES
Problem: Falls - Risk of  Goal: *Absence of Falls  Description: Document Albin Rangel Fall Risk and appropriate interventions in the flowsheet.   Outcome: Progressing Towards Goal  Note: Fall Risk Interventions:            Medication Interventions: Patient to call before getting OOB, Teach patient to arise slowly                   Problem: Patient Education: Go to Patient Education Activity  Goal: Patient/Family Education  Outcome: Progressing Towards Goal     Problem: Deep Venous Thrombosis - Risk of  Goal: *Absence of deep venous thrombosis signs and symptoms(Stroke Metric)  Outcome: Progressing Towards Goal  Goal: *Absence of impaired coagulation signs and symptoms  Outcome: Progressing Towards Goal  Goal: *Knowledge of prescribed medications  Outcome: Progressing Towards Goal  Goal: *Absence of bleeding  Outcome: Progressing Towards Goal     Problem: Patient Education: Go to Patient Education Activity  Goal: Patient/Family Education  Outcome: Progressing Towards Goal     Problem: Pain  Goal: *Control of Pain  Outcome: Progressing Towards Goal  Goal: *PALLIATIVE CARE:  Alleviation of Pain  Outcome: Progressing Towards Goal     Problem: Patient Education: Go to Patient Education Activity  Goal: Patient/Family Education  Outcome: Progressing Towards Goal

## 2020-04-15 NOTE — CONSULTS
Consult/History & Physical    Patient: Marycarmen Batista MRN: [id number]  SSN: [id number]    YOB: 1960  Age: 62 y.o. Sex: female      Subjective:      Marycarmen Batista is a 62 y.o. female who is being seen for a right patella fracture. Pt fell 2 days ago while walking her labrador retriever dog. She caught her toe on an uneven sidewalk and fell forward landing on her anterior right knee. She felt immediate pain and developed swelling. X rays in ED revealed a displaced transverse right patella fracture. Ms. Carolina Andrade is  living in [place] with her granddaughter and a friend. She is a retired grocery  and . She takes ASA s/p coronary artery stent.       Past Medical History:   Diagnosis Date    Anxiety     Arthritis     CAD (coronary artery disease)     ONE VESSEL STENT 3/2017 [facility] Cervical stenosis of spine     Coronary artery disease involving native coronary artery of native heart 2017    Deficiency anemia     Depression     Diabetes ([address]     Diet controlled (per pt.)    Esophageal reflux     FOOD RELATED HEART BURN     Exercise tolerance finding 07/2018    WALKS 20 MINUTES 2 DAYS, HOUSE AND YARD WORK, CAN CLIMBS STAIRS AND LAY FLAT-- DENIES SOB OR CHEST PAINS    Fibromyalgia     GERD (gastroesophageal reflux disease)     Gross hematuria     HLD (hyperlipidemia)     Hypercholesteremia     Hypertension     Kidney stone     Mitral valve prolapse     NO PROBLEMS     PONV (postoperative nausea and vomiting)     Right kidney stone     S/P angioplasty with stent 2017    Cath 2/7/17, Dr[facility] Derrick Jesus, [facility]    Smoker     Wears glasses      Past Surgical History:   Procedure Laterality Date    HX CHOLECYSTECTOMY      HX CORONARY STENT PLACEMENT  03/2017    ONE VESSEL STENT    HX OTHER SURGICAL      SPINE LO cervical spine    HX SHOULDER ARTHROSCOPY Right 07/2018    HX TUBAL LIGATION Bilateral     HX UROLOGICAL  05/10/2019    S/p cystoscopy, exchange of JJ stent, right URS, laser lithotripsy, and removal of stone by Dr. Roberto Burris at [facility]      History reviewed. No pertinent family history. Social History     Tobacco Use    Smoking status: Current Every Day Smoker     Packs/day: 1.00     Years: 30.00     Pack years: 30.00     Types: Cigarettes    Smokeless tobacco: Never Used   Substance Use Topics    Alcohol use: No      Current Facility-Administered Medications   Medication Dose Route Frequency Provider Last Rate Last Dose    lidocaine (PF) (XYLOCAINE) 10 mg/mL (1 %) injection 0.1 mL  0.1 mL SubCUTAneous PRN Portillo Common, CRNA        lactated Ringers infusion  25 mL/hr IntraVENous CONTINUOUS Portillo Common, CRNA 25 mL/hr at 04/15/20 1043 25 mL/hr at 04/15/20 1043    ceFAZolin (ANCEF) 2g IVPB in 50 mL D5W  2 g IntraVENous ON CALL TO OR Gage Carlton PA-C        vancomycin (VANCOCIN) 1,000 mg in 0.9% sodium chloride (MBP/ADV) 250 mL adv  1,000 mg IntraVENous ON CALL TO OR Gage Carlton PA-C 125 mL/hr at 04/15/20 1043 1,000 mg at 04/15/20 1043    tranexamic acid (CYKLOKAPRON) 1,000 mg in 0.9% sodium chloride 110 mL IVPB  1 g IntraVENous ONCE Gage Carlton PA-C        scopolamine (TRANSDERM-SCOP) 1 mg over 3 days 1 Patch  1 Patch TransDERmal River Araujo MD   1 Patch at 04/15/20 1054    fentaNYL citrate (PF) 50 mcg/mL injection             midazolam (VERSED) 1 mg/mL injection                 Allergies   Allergen Reactions    Oxycodone-Acetaminophen Other (comments) and Itching       Review of Systems:  A comprehensive review of systems was negative except for that written in the History of Present Illness.     Objective:     Vitals:    04/14/20 0828 04/15/20 1036   BP:  173/72   Pulse:  60   Resp:  20   Temp:  97.9 °F (36.6 °C)   SpO2:  99%   Weight: 145 lb (65.8 kg) 145 lb (65.8 kg)   Height: 5' 2\" (1.575 m) 5' 2\" (1.575 m)        Physical Exam:  General:  Alert, cooperative, no distress, appears stated age.   Eyes:  Conjunctivae/corneas clear. PERRL, EOMs intact. Fundi benign   Ears:  Normal TMs and external ear canals both ears. Nose: Nares normal. Septum midline. Mucosa normal. No drainage or sinus tenderness. Mouth/Throat: Lips, mucosa, and tongue normal. Teeth and gums normal.   Neck: Supple, symmetrical, trachea midline, no adenopathy, thyroid: no enlargment/tenderness/nodules, no carotid bruit and no JVD. Back:   Symmetric, no curvature. ROM normal. No CVA tenderness. Lungs:   Clear to auscultation bilaterally. Heart:  Regular rate and rhythm, S1, S2 normal, no murmur, click, rub or gallop. Abdomen:   Soft, non-tender. Bowel sounds normal. No masses,  No organomegaly. Extremities: Anterior right knee swelling  3+ effusion  ROM limited and painful R knee  No abrasions or lacerations   Pulses: 2+ and symmetric all extremities. Skin: Skin color, texture, turgor normal. No rashes or lesions   Lymph nodes: Cervical, supraclavicular, and axillary nodes normal.   Neurologic: CNII-XII intact. Normal strength, sensation and reflexes throughout. XR RIGHT KNEE [facility] 4/13/20     Findings: Alignment is near-anatomic. There is a moderately comminuted and  mildly displaced patellar fracture. There is no other definite acute fracture  evident. Small lipohemarthrosis.     IMPRESSION  Impression:  1.   Comminuted and mildly displaced patellar fracture  Assessment:     Hospital Problems  Date Reviewed: 4/15/2020          Codes Class Noted POA    Displaced transverse fracture of right patella, initial encounter for closed fracture ICD-10-CM: S82.031A  ICD-9-CM: 822.0  4/15/2020 Unknown        Fibromyalgia (Chronic) ICD-10-CM: M79.7  ICD-9-CM: 729.1  4/15/2020 Unknown        Coronary artery disease involving native coronary artery of native heart (Chronic) ICD-10-CM: I25.10  ICD-9-CM: 414.01  2/7/2017 Yes        S/P angioplasty with stent ICD-10-CM: X81.166  ICD-9-CM: V45.89  2/7/2017 Yes    Overview Signed 2/7/2017  4:04 PM by Danton Klinefelter, PA-C     Cath 2/7/17, Dr. Corie Chakraborty, [facility]:  1. Single vessel CAD with abnormal FFR of 0.64  2. Successful PTCA and PCI of LCX with 2.5 x 20 mm Synergy drug-eluting stent postdilated to 2.65 mm proximally tapering to 2.62 mm distally                   Plan: To OR for ORIF  Risks of surgery outlined and informed consent obtained.   Femoral nerve block    Signed By: Nikita Kamara MD     April 15, 2020

## 2020-04-15 NOTE — ANESTHESIA PREPROCEDURE EVALUATION
Relevant Problems   No relevant active problems       Anesthetic History     PONV          Review of Systems / Medical History  Patient summary reviewed, nursing notes reviewed and pertinent labs reviewed    Pulmonary          Smoker         Neuro/Psych         Psychiatric history    Comments:   + Anxiety/Depression Cardiovascular    Hypertension  Valvular problems/murmurs        CAD, cardiac stents and hyperlipidemia      Comments:     + Hx/o MVP   GI/Hepatic/Renal     GERD: well controlled    Renal disease: stones       Endo/Other    Diabetes: well controlled, type 2    Arthritis     Other Findings   Comments:   Surgical History    Surgery Date Site/Laterality Comments  SPINE SURGERY         CHOLECYSTECTOMY         TUBAL LIGATION   Bilateral     CORONARY STENT PLACEMENT 3/1/2017 - 3/31/2017   ONE VESSEL STENT    ARTHROSCOPY SHOULDER 7/18/2018 Shoulder/Right Procedure: ARTHROSCOPY SHOULDER WITH ROTATOR CUFF REPAIR , ACROMIOPLASTY; Surgeon: Ioana Pittman MD    URETEROSCOPY W/ LITHOTRIPSY 4/18/2019 Urethra/Right Procedure: CYSTOSCOPY, WITH attempted URETEROSCOPY, , AND INDWELLING URETERAL STENT INSERTION; Surgeon: Radha Bedolla MD    Medical devices from this surgery are in the Implants section.    URETEROSCOPY W/ LITHOTRIPSY 5/10/2019 Ureter/Right Procedure: CYSTOSCOPY, WITH URETEROSCOPY, LASER LITHOTRIPSY, AND INDWELLING URETERAL STENT INSERTION; Surgeon: Radha Bedolla MD    Medical devices from this surgery are in the Implants section.      Medical History    Medical History Date Comments  Hypercholesteremia      Wears glasses      Exercise tolerance finding 07/17/2018 WALKS 20 MINUTES 2 DAYS, HOUSE AND YARD WORK, CAN CLIMBS STAIRS AND LAY FLAT-- DENIES SOB OR CHEST PAINS  Anxiety      Mitral valve prolapse   NO PROBLEMS  Esophageal reflux   FOOD RELATED HEART BURN  Smoker      PONV (postoperative nausea and vomiting)      Adverse effect of anesthesia   took a long time to wake up Hypertension   stable w/ meds   Arthropathy      Coronary artery disease             Physical Exam    Airway  Mallampati: II  TM Distance: 4 - 6 cm  Neck ROM: normal range of motion        Cardiovascular    Rhythm: regular  Rate: normal         Dental  No notable dental hx       Pulmonary  Breath sounds clear to auscultation               Abdominal  GI exam deferred       Other Findings            Anesthetic Plan    ASA: 3  Anesthesia type: general and regional - femoral single shot      Post-op pain plan if not by surgeon: peripheral nerve block single    Induction: Intravenous  Anesthetic plan and risks discussed with: Patient      Scopolamine patch ordered preop. Per Dr. Natali Walden, he requests a Femoral Nerve Block for postoperative pain control to be done preoperatively. ..

## 2020-04-15 NOTE — ANESTHESIA POSTPROCEDURE EVALUATION
Procedure(s):  RIGHT PATELLA OPEN REDUCTION INTERNAL FIXATION/C-ARM/SYNTHES.    general    Anesthesia Post Evaluation      Multimodal analgesia: multimodal analgesia used between 6 hours prior to anesthesia start to PACU discharge  Patient location during evaluation: PACU  Patient participation: complete - patient participated  Level of consciousness: sleepy but conscious  Pain score: 0  Pain management: adequate  Airway patency: patent  Anesthetic complications: no  Cardiovascular status: acceptable  Respiratory status: acceptable  Hydration status: acceptable  Comments: , despite takintg BP medication earlier today, so will treat with Labetalol now; patient has NO pain and very evident functional Right Femoral Nerve Blockade, also, denies nausea. .. Post anesthesia nausea and vomiting:  none      Vitals Value Taken Time   /66 4/15/2020  1:01 PM   Temp 36.7 °C (98.1 °F) 4/15/2020  1:01 PM   Pulse 71 4/15/2020  1:03 PM   Resp 19 4/15/2020  1:03 PM   SpO2 100 % 4/15/2020  1:03 PM   Vitals shown include unvalidated device data.

## 2020-04-15 NOTE — INTERVAL H&P NOTE
Update History & Physical 
 
The Patient's History and Physical of April 15,  
2020 was reviewed with the patient and I examined the patient. There was no change. The surgical site was confirmed by the patient and me. Plan:  The risk, benefits, expected outcome, and alternative to the recommended procedure have been discussed with the patient. Patient understands and wants to proceed with the procedure.  
 
Electronically signed by Kenya Najera MD on 4/15/2020 at 11:23 AM

## 2020-04-15 NOTE — OP NOTES
[address] [place] [address]   OPERATIVE REPORT    Name:  Matt Valles  MR#:   [id number]  :  1960  ACCOUNT #:  [de-identified]  DATE OF SERVICE:  04/15/2020    PREOPERATIVE DIAGNOSIS:  Displaced right patella fracture. POSTOPERATIVE DIAGNOSIS:  Displaced right patella fracture. PROCEDURE PERFORMED:  Open reduction and internal fixation, right patella fracture. SURGEON:  Dhara Arce MD    SURGICAL ASSISTANT:  Uday Dubon. ANESTHESIA:  General and also femoral nerve block. COMPLICATIONS:  None. SPECIMENS REMOVED:  None. IMPLANTS:  Synthes screws and cable. ESTIMATED BLOOD LOSS:  Minimal.    CONDITION:  Good. PROCEDURE IN DETAIL:  After satisfactory general anesthesia, the patient in supine position, the patient's right lower extremity was prepped with ChloraPrep solution and draped in the usual fashion for knee surgery. The patient's right lower extremity was exsanguinated with elevation and the tourniquet was inflated to 350 mmHg. An anterior longitudinal incision was made overlying the patient's right patella. The incision was carried down through the subcutaneous tissue to the patellar fracture site. There was a transverse fracture through the mid patella with apex anterior angulation and gapping mostly anteriorly. The knee was evacuated of hematoma and irrigated thoroughly. The fracture was reduced and held in place with a single tenaculum bone-holding clamp. Two guide pins were placed from proximal to distal across the reduced fracture site from the cannulated small-fragment Synthes set. The guide pins were overdrilled with a 3.5 mm drill bit. Two 4.0 cannulated cancellus screws were then driven from proximal to distal across the reduced fracture site. A cerclage 1.0 Synthes cable was then passed in a figure-of-eight method through the screws and across the anterior patella for a tension band wiring effect. The cable was tightened, securing the fracture fixation.   The wound was irrigated thoroughly including a dilute Betadine lavage. The entry sites for the screws were closed with #2 Vicryl sutures. Subcutaneous tissue was closed with a 2-0 Vicryl and staples were used for the skin. Bulky sterile dressing was applied over which a knee immobilizer was applied. The patient was transported back to recovery room in good condition. Sponge and needle counts were correct.       Osvaldo Proctor MD      [id number]  D:  04/15/2020 12:46  T:  04/15/2020 16:40  JOB #:  [id number]

## 2020-04-16 ENCOUNTER — HOME HEALTH ADMISSION (OUTPATIENT)
Dept: HOME HEALTH SERVICES | Facility: HOME HEALTH | Age: 60
End: 2020-04-16
Payer: COMMERCIAL

## 2020-04-16 VITALS
HEART RATE: 72 BPM | TEMPERATURE: 99.2 F | SYSTOLIC BLOOD PRESSURE: 119 MMHG | BODY MASS INDEX: 26.68 KG/M2 | HEIGHT: 62 IN | WEIGHT: 145 LBS | DIASTOLIC BLOOD PRESSURE: 57 MMHG | OXYGEN SATURATION: 95 % | RESPIRATION RATE: 15 BRPM

## 2020-04-16 PROCEDURE — 97535 SELF CARE MNGMENT TRAINING: CPT

## 2020-04-16 PROCEDURE — 97161 PT EVAL LOW COMPLEX 20 MIN: CPT

## 2020-04-16 PROCEDURE — 74011250637 HC RX REV CODE- 250/637: Performed by: SPECIALIST

## 2020-04-16 PROCEDURE — 77030027138 HC INCENT SPIROMETER -A

## 2020-04-16 PROCEDURE — 97530 THERAPEUTIC ACTIVITIES: CPT

## 2020-04-16 PROCEDURE — 97165 OT EVAL LOW COMPLEX 30 MIN: CPT

## 2020-04-16 PROCEDURE — 97116 GAIT TRAINING THERAPY: CPT

## 2020-04-16 RX ORDER — AMOXICILLIN 250 MG
1 CAPSULE ORAL 2 TIMES DAILY
Qty: 30 TAB | Refills: 0 | Status: SHIPPED | OUTPATIENT
Start: 2020-04-16 | End: 2020-04-16

## 2020-04-16 RX ORDER — AMOXICILLIN 250 MG
1 CAPSULE ORAL 2 TIMES DAILY
Qty: 30 TAB | Refills: 0 | Status: SHIPPED | OUTPATIENT
Start: 2020-04-16

## 2020-04-16 RX ORDER — PROMETHAZINE HYDROCHLORIDE 25 MG/1
25 TABLET ORAL
Qty: 20 TAB | Refills: 0 | Status: SHIPPED | OUTPATIENT
Start: 2020-04-16

## 2020-04-16 RX ORDER — HYDROCODONE BITARTRATE AND ACETAMINOPHEN 7.5; 325 MG/1; MG/1
1-2 TABLET ORAL
Qty: 42 TAB | Refills: 0 | Status: SHIPPED | OUTPATIENT
Start: 2020-04-16 | End: 2020-04-19

## 2020-04-16 RX ORDER — HYDROCODONE BITARTRATE AND ACETAMINOPHEN 7.5; 325 MG/1; MG/1
1 TABLET ORAL
Qty: 42 TAB | Refills: 0 | Status: SHIPPED | OUTPATIENT
Start: 2020-04-16 | End: 2020-04-21 | Stop reason: SDUPTHER

## 2020-04-16 RX ADMIN — HYDROCODONE BITARTRATE AND ACETAMINOPHEN 1 TABLET: 7.5; 325 TABLET ORAL at 06:02

## 2020-04-16 RX ADMIN — ATORVASTATIN CALCIUM 20 MG: 20 TABLET, FILM COATED ORAL at 08:44

## 2020-04-16 RX ADMIN — HYDROCODONE BITARTRATE AND ACETAMINOPHEN 1 TABLET: 7.5; 325 TABLET ORAL at 02:05

## 2020-04-16 RX ADMIN — ESCITALOPRAM OXALATE 10 MG: 10 TABLET ORAL at 08:44

## 2020-04-16 RX ADMIN — METOPROLOL TARTRATE 50 MG: 50 TABLET, FILM COATED ORAL at 08:38

## 2020-04-16 RX ADMIN — ACETAMINOPHEN 650 MG: 325 TABLET ORAL at 12:12

## 2020-04-16 RX ADMIN — HYDROCODONE BITARTRATE AND ACETAMINOPHEN 1 TABLET: 7.5; 325 TABLET ORAL at 12:13

## 2020-04-16 NOTE — PROGRESS NOTES
Reason for Admission:  Patella fracture [S82.009A]                 RRAT Score:    N/A            Plan for utilizing home health:    Yes, freedom of choice signed for [facility]. Referral sent to [facility]. Likelihood of Readmission:   LOW                         Transition of Care Plan:     Home with home health         Initial assessment completed with patient. Cognitive status of patient: oriented to time, place, person and situation. Face sheet information confirmed:  yes. The patient designates friend Kwame Jacinto to participate in her discharge plan and to receive any needed information. This patient lives in a [facility] with granddaughter. Patient is not able to navigate steps as needed. Prior to hospitalization, patient was considered to be independent with ADLs/IADLS : yes . Patient has a current ACP document on file: no  The friend will be available to transport patient home upon discharge. The patient already has W/C, crutches, and  medical equipment available in the home. Patient is not currently active with home health. Patient has not stayed in a skilled nursing facility or rehab. This patient is on dialysis :no    List of available Home Health agencies were provided and reviewed with the patient prior to discharge. Freedom of choice signed: yes, for [facility]. Currently, the discharge plan is Home with [address]. The patient states that she can obtain her medications from the pharmacy, and take her medications as directed. Patient's current insurance is [employer]. Care Management Interventions  PCP Verified by CM: Yes  Mode of Transport at Discharge: Self  Transition of Care Consult (CM Consult): Discharge Planning, [address]: Yes  Physical Therapy Consult: Yes  Occupational Therapy Consult: Yes  Current Support Network:  Other(lives with granddaughter)  Confirm Follow Up Transport: Friends  The Plan for Transition of Care is Related to the Following Treatment Goals : home with home health  The Patient and/or Patient Representative was Provided with a Choice of Provider and Agrees with the Discharge Plan?: Yes  Freedom of Choice List was Provided with Basic Dialogue that Supports the Patient's Individualized Plan of Care/Goals, Treatment Preferences and Shares the Quality Data Associated with the Providers?: Yes  Discharge Location  Discharge Placement: Home with home health        Chrissy Richardson RN - Outcomes Manager  [phone]

## 2020-04-16 NOTE — DISCHARGE SUMMARY
DISCHARGE SUMMARY            ADMISSION DIAGNOSIS: Patella fracture [S82.009A]    DISCHARGE DIAGNOSIS: Status post patella fracture        Subjective: 62 y.o., female, 1 Day Post-Op, Procedure(s):  RIGHT PATELLA OPEN REDUCTION INTERNAL FIXATION/C-ARM/SYNTHES     Admitting date:15 April 20    Date of Discharge/Transfer:     Physical Exam (day of transfer / discharge):  Virtual Visit Patient in room 514, provider at [facility] office, Time spent with patient on Video Conference 8 minutes. Condition at Discharge: Stable and cleared to advance to next level of care / rehabilitation. [address] [place] [address]   OPERATIVE REPORT     Name:  Teresita Goodwin  MR#:   [id number]  :  1960  ACCOUNT #:  [de-identified]  DATE OF SERVICE:  04/15/2020     PREOPERATIVE DIAGNOSIS:  Displaced right patella fracture.     POSTOPERATIVE DIAGNOSIS:  Displaced right patella fracture.     PROCEDURE PERFORMED:  Open reduction and internal fixation, right patella fracture.     SURGEON:  Jesus Rodriguez MD     SURGICAL ASSISTANT:  Jade Wahl.     ANESTHESIA:  General and also femoral nerve block.     COMPLICATIONS:  None.     SPECIMENS REMOVED:  None.     History:  Past Medical History:   Diagnosis Date    Anxiety     Arthritis     CAD (coronary artery disease)     ONE VESSEL STENT 3/2017 [facility] Cervical stenosis of spine     Coronary artery disease involving native coronary artery of native heart 2017    Deficiency anemia     Depression     Diabetes (HCC)     Diet controlled (per pt.)    Esophageal reflux     FOOD RELATED HEART BURN     Exercise tolerance finding 2018    WALKS 20 MINUTES 2 DAYS, HOUSE AND YARD WORK, CAN CLIMBS STAIRS AND LAY FLAT-- DENIES SOB OR CHEST PAINS    Fibromyalgia     GERD (gastroesophageal reflux disease)     Gross hematuria     HLD (hyperlipidemia)     Hypercholesteremia     Hypertension     Kidney stone     Mitral valve prolapse     NO PROBLEMS     PONV (postoperative nausea and vomiting)     Right kidney stone     S/P angioplasty with stent 2017    Cath 2/7/17, Dr. Genaro Tinsley, [facility] Wears glasses              History of Present Illness:     Ms. Trae Mcnulty is a pleasant female who suffered a well documented radiographic history of fall with severe fractured right patella . Trae Mcnulty had failed all conservative measures as directed by Dr. Thanh Schuster, and in light of Trae Mcnulty progressive pain and difficultly safely performing her  necessary Activities of Daily Living, Dr. Kianna Pacheco recommended a Open reduction with Internal Fixation of Right displaced patella fracture.       PAST MEDICAL HISTORY:   Past Medical History:   Diagnosis Date    Anxiety     Arthritis     CAD (coronary artery disease)     ONE VESSEL STENT 3/2017 [facility] Cervical stenosis of spine     Coronary artery disease involving native coronary artery of native heart 2017    Deficiency anemia     Depression     Diabetes ([address]     Diet controlled (per pt.)    Esophageal reflux     FOOD RELATED HEART BURN     Exercise tolerance finding 07/2018    WALKS 20 MINUTES 2 DAYS, HOUSE AND YARD WORK, CAN CLIMBS STAIRS AND LAY FLAT-- DENIES SOB OR CHEST PAINS    Fibromyalgia     GERD (gastroesophageal reflux disease)     Gross hematuria     HLD (hyperlipidemia)     Hypercholesteremia     Hypertension     Kidney stone     Mitral valve prolapse     NO PROBLEMS     PONV (postoperative nausea and vomiting)     Right kidney stone     S/P angioplasty with stent 2017    Cath 2/7/17, [facility] Genaro T[facility]ley, [facility]    Smoker     Wears glasses        PAST SURGICAL HISTORY:   Past Surgical History:   Procedure Laterality Date    HX CHOLECYSTECTOMY      HX CORONARY STENT PLACEMENT  03/2017    ONE VESSEL STENT    HX OTHER SURGICAL      SPINE LO cervical spine    HX SHOULDER ARTHROSCOPY Right 07/2018    HX TUBAL LIGATION Bilateral  HX UROLOGICAL  05/10/2019    S/p cystoscopy, exchange of JJ stent, right URS, laser lithotripsy, and removal of stone by Dr. Carlos Pate at [address]:   Allergies   Allergen Reactions    Oxycodone-Acetaminophen Other (comments) and Itching        CURRENT MEDICATIONS:  A list of medications prior to the time of admission include:  Prior to Admission medications    Medication Sig Start Date End Date Taking? Authorizing Provider   promethazine (PHENERGAN) 25 mg tablet Take 1 Tab by mouth every eight (8) hours as needed for Nausea. Indications: prevent nausea and vomiting after surgery 4/16/20  Yes Sondra Carlton PA-C   HYDROcodone-acetaminophen (Norco) 7.5-325 mg per tablet Take 1-2 Tabs by mouth every eight (8) hours as needed for Pain for up to 3 days. Max Daily Amount: 6 Tabs. 4/16/20 4/19/20 Yes Sondra Carlton PA-C   HYDROcodone-acetaminophen (NORCO) 7.5-325 mg per tablet Take 1 Tab by mouth every eight (8) hours as needed for Pain for up to 14 days. Max Daily Amount: 3 Tabs. 4/16/20 4/30/20 Yes Gage Carlton PA-C   senna-docusate (PERICOLACE) 8.6-50 mg per tablet Take 1 Tab by mouth two (2) times a day. Indications: constipation 4/16/20  Yes Sondra Carlton PA-C   atorvastatin (LIPITOR) 20 mg tablet Take  by mouth daily. Yes Provider, Historical   HYDROcodone-acetaminophen (NORCO) 5-325 mg per tablet Take 1-2 Tabs by mouth every four (4) hours as needed. Max Daily Amount: 12 Tabs. 2/18/15  Yes Misa Gardner NP   METOPROLOL TARTRATE (LOPRESSOR PO) Take 50 mg by mouth two (2) times a day. Yes Provider, Historical   escitalopram oxalate (LEXAPRO) 5 mg tablet Take 10 mg by mouth daily. Yes Provider, Historical   senna-docusate (PERICOLACE) 8.6-50 mg per tablet Take 1 Tab by mouth two (2) times a day. 4/16/20 4/16/20  Sondra Carlton PA-C   aspirin delayed-release 81 mg tablet Take  by mouth daily. Provider, Historical       FAMILY HISTORY: History reviewed.  No pertinent family history. SOCIAL HISTORY:   Social History     Socioeconomic History    Marital status:      Spouse name: Not on file    Number of children: Not on file    Years of education: Not on file    Highest education level: Not on file   Tobacco Use    Smoking status: Current Every Day Smoker     Packs/day: 1.00     Years: 30.00     Pack years: 30.00     Types: Cigarettes    Smokeless tobacco: Never Used   Substance and Sexual Activity    Alcohol use: No    Drug use: No       REVIEW OF SYSTEMS: All review of systems are negative. Social History     Occupational History    Not on file   Tobacco Use    Smoking status: Current Every Day Smoker     Packs/day: 1.00     Years: 30.00     Pack years: 30.00     Types: Cigarettes    Smokeless tobacco: Never Used   Substance and Sexual Activity    Alcohol use: No    Drug use: No    Sexual activity: Not on file       Hospital Course:     Ms. Camille Johnson was [facility] to [facility] on the morning of 15 April 20 under the care of Dr. Edenilson Butcher. she was taken to the operating theater under Dr. Neto Bingham direction, first assisted by trained surgical assistant's where she underwent an Open reduction and internal fixation, right patella fracture. she tolerated the procedure well, and there were no intra operative complications. Post operatively she was transferred medically stable to post op holding. After all safety criteria had been met during her immediate post op recovery phase she was transferred medical stable to [address] [facility] to begin comprehensive physical and occupational therapies, restorative nursing and thrombo embolism (DVT/PE) prophylaxis. Ms. Camille Johnson post operative course progressed slow but directed. The focus throughout the post op period focused on range of motion and pain control.  An acute post operative blood loss anemia was noted post operatively and there was no need to transfuse packed red blood cells. Medically she  remained stable through the remainder of the hospital stay. In light of the slow gains attained by Ms. Izella Apley post operatively she is being recommended for discharge home. DISCHARGE PLAN:      The patient will be discharged to home. DIET:  Low Calorie High Protein Diet    NUTRITION: OTC Nutritional supplements, multivitamins      ACTIVITY: No lifting, Driving, or Strenuous exercise and No heavy lifting, pushing, pulling. Weight Bearing/Range of Motion Restrictions: Per Protocol    WOUND / INCISION CARE: Keep wound clean and dry, Reinforce dressing PRN and Ice to area for comfort Keep the current dressings on and in place. There is no need to change these current dressings. Dressings to please be changed by home nurse or nursing home staff each day. Keep all pets away from any wound present in order to prevent infection. PAIN CONTROL: Prescriptions written: On chart    PRECAUTIONS: Weight Bearing/Range of Motion per Restrictions: See Below    VTE PROPHYLAXIS: ASA 81mg (po) Q Day    ANTIBIOTICS: None at this time. MEDICATIONS AT DISCHARGE:  Current Discharge Medication List      START taking these medications    Details   promethazine (PHENERGAN) 25 mg tablet Take 1 Tab by mouth every eight (8) hours as needed for Nausea. Indications: prevent nausea and vomiting after surgery  Qty: 20 Tab, Refills: 0      !! HYDROcodone-acetaminophen (Norco) 7.5-325 mg per tablet Take 1-2 Tabs by mouth every eight (8) hours as needed for Pain for up to 3 days. Max Daily Amount: 6 Tabs. Qty: 42 Tab, Refills: 0    Associated Diagnoses: Acute post-operative pain      !! HYDROcodone-acetaminophen (NORCO) 7.5-325 mg per tablet Take 1 Tab by mouth every eight (8) hours as needed for Pain for up to 14 days. Max Daily Amount: 3 Tabs.   Qty: 42 Tab, Refills: 0    Associated Diagnoses: Acute post-operative pain      senna-docusate (PERICOLACE) 8.6-50 mg per tablet Take 1 Tab by mouth two (2) times a day. Indications: constipation  Qty: 30 Tab, Refills: 0       !! - Potential duplicate medications found. Please discuss with provider. CONTINUE these medications which have NOT CHANGED    Details   atorvastatin (LIPITOR) 20 mg tablet Take  by mouth daily. METOPROLOL TARTRATE (LOPRESSOR PO) Take 50 mg by mouth two (2) times a day. escitalopram oxalate (LEXAPRO) 5 mg tablet Take 10 mg by mouth daily. aspirin delayed-release 81 mg tablet Take  by mouth daily. STOP taking these medications       HYDROcodone-acetaminophen (NORCO) 5-325 mg per tablet Comments:   Reason for Stopping:                             Physical and Occupational therapies:    will continue with attention to standard postop precautions / restrictions and below:    [ ] RUE [XX] RLE  [ ] LUE  [ ] LLE    [XX] Full WBAT with straight leg immobilizer  [ ] Partial WBAT   [ ] Toetouch WB   [ ] Non Weight Bearing: Follow up:    [ ] 1 week  [ ] 10 days  [XX] Specific Date: 20 April 20      Per [facility] Protocol this  case was discussed with attending surgeon and reflects their orders as confirmed by their co signature.      Very Respectfully,        Wendy Rodriguez, APA, APC, MPAS PA-C  Surigical Physician Assistant-C  Department of [address] and Spine Specialities   [address] [facility]     [facility]  Contact Cell [phone]

## 2020-04-16 NOTE — PROGRESS NOTES
Problem: Self Care Deficits Care Plan (Adult)  Goal: *Acute Goals and Plan of Care (Insert Text)  Outcome: Resolved/Met       OCCUPATIONAL THERAPY EVALUATION/DISCHARGE    Patient: Elmer Gregory (72 y.o. female)  Date: 4/16/2020  Primary Diagnosis: Patella fracture [S82.009A]  Procedure(s) (LRB):  RIGHT PATELLA OPEN REDUCTION INTERNAL FIXATION/C-ARM/SYNTHES (Right) 1 Day Post-Op   Precautions: Fall, TTWB(RLE)  PLOF: Patient was independent with self-care and functional mobility PTA. ASSESSMENT AND RECOMMENDATIONS:  Patient cleared to participate in OT evaluation by RN. Upon entering the room, patient was supine in bed, alert, and agreeable to participate in OT evaluation. Patient educated on weight-bearing status, immobilizer, and safety around the house. Patient is stand by assist - independent with basic self-care, modified independent with bed mobility using immobilizer straps as leg  and stand by assist with functional transfers using rolling walker. Based on the objective data described below, the patient presents with no deficits that impede pt function with ADLs, functional transfers, and functional mobility. At this time patient is safe to d/c home with family support. OT to d/c from caseload at this time. Skilled occupational therapy is not indicated at this time.   Discharge Recommendations: Home Health Safety Eval  Further Equipment Recommendations for Discharge: bedside commode and rolling walker      SUBJECTIVE:   Patient stated David Devoid been using my friends crutches    OBJECTIVE DATA SUMMARY:     Past Medical History:   Diagnosis Date    Anxiety     Arthritis     CAD (coronary artery disease)     ONE VESSEL STENT 3/2017 [facility]     Cervical stenosis of spine     Coronary artery disease involving native coronary artery of native heart 2017    Deficiency anemia     Depression     Diabetes ([facility] [address]     Diet controlled (per pt.)    Esophageal reflux     FOOD RELATED HEART BURN Exercise tolerance finding 07/2018    WALKS 20 MINUTES 2 DAYS, HOUSE AND YARD WORK, CAN CLIMBS STAIRS AND LAY FLAT-- DENIES SOB OR CHEST PAINS    Fibromyalgia     GERD (gastroesophageal reflux disease)     Gross hematuria     HLD (hyperlipidemia)     Hypercholesteremia     Hypertension     Kidney stone     Mitral valve prolapse     NO PROBLEMS     PONV (postoperative nausea and vomiting)     Right kidney stone     S/P angioplasty with stent 2017    Cath 2/7/17, Dr. Kristine Merrill, [facility]    Smoker     Wears glasses      Past Surgical History:   Procedure Laterality Date    HX CHOLECYSTECTOMY      HX CORONARY STENT PLACEMENT  03/2017    ONE VESSEL STENT    HX OTHER SURGICAL      SPINE LO cervical spine    HX SHOULDER ARTHROSCOPY Right 07/2018    HX TUBAL LIGATION Bilateral     HX UROLOGICAL  05/10/2019    S/p cystoscopy, exchange of JJ stent, right URS, laser lithotripsy, and removal of stone by Dr. Lexi Baum at [facility]     Barriers to Learning/Limitations: None  Compensate with: visual, verbal, tactile, kinesthetic cues/model    Home Situation:   Home Situation  Home Environment: Private residence  # Steps to Enter: 0  One/Two Story Residence: Two story  # of Interior Steps: 12  Interior Rails: Left  Living Alone: No  Support Systems: Family member(s)  Patient Expects to be Discharged to[de-identified] Private residence  Current DME Used/Available at Home: Wheelchair, Crutches  Tub or Shower Type: Tub/Shower combination  []     Right hand dominant   [x]     Left hand dominant    Cognitive/Behavioral Status:  Neurologic State: Alert  Orientation Level: Oriented X4  Cognition: Follows commands  Safety/Judgement: Fall prevention    Skin: Intact  Edema: None noted    Vision/Perceptual:    Acuity: Within Defined Limits      Coordination: BUE  Fine Motor Skills-Upper: Left Intact; Right Intact    Gross Motor Skills-Upper: Left Intact; Right Intact    Balance:  Sitting: Intact  Standing: Impaired; Without support  Standing - Static: Good  Standing - Dynamic : Fair    Strength: BUE  Strength: Within functional limits       Tone & Sensation: BUE  Tone: Normal  Sensation: Intact       Range of Motion: BUE  AROM: Within functional limits         Functional Mobility and Transfers for ADLs:  Bed Mobility:  Supine to Sit: Modified independent(uses brace to assist RLE)  Scooting: Modified independent    Transfers:  Sit to Stand: Stand-by assistance  Stand to Sit: Stand-by assistance   Toilet Transfer : Stand-by assistance(BSC)    ADL Assessment:  Feeding: Independent    Oral Facial Hygiene/Grooming: Independent    Bathing: Stand-by assistance; Adaptive equipment    Upper Body Dressing: Modified independent    Lower Body Dressing: Stand-by assistance; Adaptive equipment; Additional time(contact guard assist for balance, standing)    Toileting: Stand by assistance    ADL Intervention:  Lower Body Dressing Assistance  Dressing Assistance: Stand-by assistance  Protective Undergarmet: Stand-by assistance(immobilizer)  Pants With Elastic Waist: Compensatory technique training(simulation donning RLE first)  Socks: Stand-by assistance(LLE)  Leg Crossed Method Used: Yes  Adaptive Equipment Used: Reacher    Toileting  Toileting Assistance: Stand-by assistance  Bladder Hygiene: Stand-by assistance    Cognitive Retraining  Safety/Judgement: Fall prevention    Pain:  Pain level pre-treatment: 0/10   Pain level post-treatment: 2/10, RLE   Pain Intervention(s): Medication (see MAR); Response to intervention: Nurse notified, See doc flow    Activity Tolerance:   Good    Please refer to the flowsheet for vital signs taken during this treatment.   After treatment:   [x]  Patient left in no apparent distress sitting up in chair  []  Patient left in no apparent distress in bed  [x]  Call bell left within reach  [x]  Nursing notified  []  Caregiver present  []  Bed alarm activated    COMMUNICATION/EDUCATION:   [x]      Role of Occupational Therapy in the acute care setting  [x]      Home safety education was provided and the patient/caregiver indicated understanding. [x]      Patient/family have participated as able and agree with findings and recommendations. []      Patient is unable to participate in plan of care at this time. Thank you for this referral.  Monique Santoyo, OTR/L  Time Calculation: 28 mins      Eval Complexity: History: LOW Complexity : Brief history review ; Examination: LOW Complexity : 1-3 performance deficits relating to physical, cognitive , or psychosocial skils that result in activity limitations and / or participation restrictions ;    Decision Making:LOW Complexity : No comorbidities that affect functional and no verbal or physical assistance needed to complete eval tasks

## 2020-04-16 NOTE — PROGRESS NOTES
Assumed patients care from [facility], RN, patients is sitting up in chair, alert awake and oriented R leg on knee immobilizer and dressings is dry and intact CMS+ no s/s of distress or sob.

## 2020-04-16 NOTE — PROGRESS NOTES
PHYSICAL THERAPY EVALUATION AND DISCHARGE    Patient: Marquez Gaitan (61 y.o. female)  Date: 4/16/2020  Primary Diagnosis: Patella fracture [S82.009A]  Procedure(s) (LRB):  RIGHT PATELLA OPEN REDUCTION INTERNAL FIXATION/C-ARM/SYNTHES (Right) 1 Day Post-Op   Precautions:   Fall, TTWB(RLE)  PLOF: Pt is independent with mobility including gait no AD. ASSESSMENT :  PT orders received and patient cleared by nursing to participate with therapy. Patient is a 62 y.o. female admitted to the hospital due to R patella fracture s/p R ORIF patella 4/15/2020 by Dr. Mulugeta Baldwin. Patient consents to PT evaluation and treatment. Pt educated on safety, mobility, therex, TTWB precautions, elevation, use of knee immobilizer, and OOB 3-5 times with nursing assistance. Pt performing bed mobility modified independent using R UE to assist lifting R LE. Sit to stands modified independent/supervision. Gait training with pt 15 feet x2 with RW supervision/modified independent. Pt performing mostly NWB vs TTWB. Practiced and educated on stairs bumping technique (scooting on bottom). Practiced one step with assistance for R LE. Pt ended therapy supine in bed with all needs met. Patient does not require further skilled physical therapy at this level of care. PLAN :  Recommendations and Planned Interventions:   No skilled inpatient physical therapy needs identified at this time. Discharge Recommendations: Home Health  Further Equipment Recommendations for Discharge: pt received RW today     SUBJECTIVE:   Patient stated to go home.     OBJECTIVE DATA SUMMARY:     Past Medical History:   Diagnosis Date    Anxiety     Arthritis     CAD (coronary artery disease)     ONE VESSEL STENT 3/2017 [facility]     Cervical stenosis of spine     Coronary artery disease involving native coronary artery of native heart 2017    Deficiency anemia     Depression     Diabetes (HCC)     Diet controlled (per pt.)    Esophageal reflux     FOOD RELATED HEART BURN     Exercise tolerance finding 07/2018    WALKS 20 MINUTES 2 DAYS, HOUSE AND YARD WORK, CAN CLIMBS STAIRS AND LAY FLAT-- DENIES SOB OR CHEST PAINS    Fibromyalgia     GERD (gastroesophageal reflux disease)     Gross hematuria     HLD (hyperlipidemia)     Hypercholesteremia     Hypertension     Kidney stone     Mitral valve prolapse     NO PROBLEMS     PONV (postoperative nausea and vomiting)     Right kidney stone     S/P angioplasty with stent 2017    Cath 2/7/17, Dr. Zannie Hamman, [facility]    Smoker     Wears glasses      Past Surgical History:   Procedure Laterality Date    HX CHOLECYSTECTOMY      HX CORONARY STENT PLACEMENT  03/2017    ONE VESSEL STENT    HX OTHER SURGICAL      SPINE LO cervical spine    HX SHOULDER ARTHROSCOPY Right 07/2018    HX TUBAL LIGATION Bilateral     HX UROLOGICAL  05/10/2019    S/p cystoscopy, exchange of JJ stent, right URS, laser lithotripsy, and removal of stone by Dr. Demetris Garrison at [place] [address]     Barriers to Learning/Limitations: None  Compensate with: N/A  Home Situation:   Home Situation  Home Environment: Private residence  # Steps to Enter: 0  One/Two Story Residence: Two story  # of Interior Steps: 12  Interior Rails: Left  Living Alone: No  Support Systems: Family member(s)(granddaughter)  Patient Expects to be Discharged to[de-identified] Private residence  Current DME Used/Available at Home: Wheelchair, Crutches(pt received RW 4/16/2020)  Tub or Shower Type: Tub/Shower combination  Critical Behavior:  Neurologic State: Alert  Orientation Level: Oriented X4  Cognition: Follows commands; Appropriate decision making  Safety/Judgement: Fall prevention;Good awareness of safety precautions  Psychosocial  Patient Behaviors: Calm; Cooperative                 B LE Strength:    Strength: (R LE decreased L LE WFL )              B LE Tone & Sensation:   Tone: Normal          Sensation: Intact(except numbness noted R foot)           B LE Range Of Motion:  AROM: (R LE knee immobilizer ankle/hip WFL, L LE WFL)                 Posture:  Posture (WDL): Within defined limits     Functional Mobility:  Bed Mobility:     Supine to Sit: Modified independent(uses brace to assist RLE)  Sit to Supine: Modified independent(using R UE to assist R LE)  Scooting: Modified independent  Transfers:  Sit to Stand: Supervision;Modified independent  Stand to Sit: Supervision;Modified independent      Balance:   Sitting: Intact  Standing: Impaired; Without support  Standing - Static: Good  Standing - Dynamic : Fair(plus)    Ambulation/Gait Training:  Distance (ft): 30 Feet (ft)  Assistive Device: Walker, rolling  Ambulation - Level of Assistance: Supervision;Modified independent  Right Side Weight Bearing: Toe touch  Left Side Weight Bearing: Full  Stance: Left increased  Speed/Juju: Pace decreased (<100 feet/min)  Step Length: Left shortened       Stairs:  Number of Stairs Trained: 1(scooting on bottom)  Stairs - Level of Assistance: Contact guard assistance(for R LE)    Therapeutic Exercises:   Reviewed and performed ankle pumps to increase blood flow and circulation. Pain:  Pain level pre-treatment: 6-7/10 R knee  Pain level post-treatment: same  Pain Intervention(s): Medication (see MAR); Rest, Repositioning   Response to intervention: Nurse notified, See doc flow    Activity Tolerance:   good  Please refer to the flowsheet for vital signs taken during this treatment. After treatment:   []         Patient left in no apparent distress sitting up in chair  [x]         Patient left in no apparent distress in bed  [x]         Call bell left within reach  [x]   Personal items in reach  [x]         Nursing notified Fe  []         Caregiver present  []         Bed/chair alarm activated  []         SCDs applied      COMMUNICATION/EDUCATION:   [x]         Role of Physical Therapy in the acute care setting. [x]         Fall prevention education was provided and the patient/caregiver indicated understanding.   [x] Patient/family have participated as able in goal setting and plan of care. [x]         Patient/family agree to work toward stated goals and plan of care. []         Patient understands intent and goals of therapy, but is neutral about his/her participation. []         Patient is unable to participate in goal setting/plan of care: ongoing with therapy staff. [x]         Out of bed with nursing assistance 3-5 times a day. []         Other:     Thank you for this referral.  Jennifer Mcqueen, PT, DPT   Time Calculation: 40 mins      Eval Complexity: History: MEDIUM  Complexity : 1-2 comorbidities / personal factors will impact the outcome/ POC Exam:HIGH Complexity : 4+ Standardized tests and measures addressing body structure, function, activity limitation and / or participation in recreation  Presentation: LOW Complexity : Stable, uncomplicated  Clinical Decision Making:Low Complexity    Overall Complexity:LOW

## 2020-04-16 NOTE — DISCHARGE INSTRUCTIONS
VisualmarksharHooked Activation    Thank you for requesting access to Commissioner. Please follow the instructions below to securely access and download your online medical record. Commissioner allows you to send messages to your doctor, view your test results, renew your prescriptions, schedule appointments, and more. How Do I Sign Up? 1. In your internet browser, go to [email or web address]  2. Click on the First Time User? Click Here link in the Sign In box. You will be redirect to the New Member Sign Up page. 3. Enter your Commissioner Access Code exactly as it appears below. You will not need to use this code after youve completed the sign-up process. If you do not sign up before the expiration date, you must request a new code. Commissioner Access Code: Activation code not generated  Current Commissioner Status: Active (This is the date your Commissioner access code will )    4. Enter the last four digits of your Social Security Number (xxxx) and Date of Birth (mm/dd/yyyy) as indicated and click Submit. You will be taken to the next sign-up page. 5. Create a Commissioner ID. This will be your Commissioner login ID and cannot be changed, so think of one that is secure and easy to remember. 6. Create a Commissioner password. You can change your password at any time. 7. Enter your Password Reset Question and Answer. This can be used at a later time if you forget your password. 8. Enter your e-mail address. You will receive e-mail notification when new information is available in [address] 9. Click Sign Up. You can now view and download portions of your medical record. 10. Click the Download Summary menu link to download a portable copy of your medical information. Additional Information    If you have questions, please visit the Frequently Asked Questions section of the Commissioner website at [email or web address] Remember, Commissioner is NOT to be used for urgent needs. For medical emergencies, dial 911.       Patient armband removed and shredded  . DISCHARGE SUMMARY from Nurse    PATIENT INSTRUCTIONS:    After general anesthesia or intravenous sedation, for 24 hours or while taking prescription Narcotics:  · Limit your activities  · Do not drive and operate hazardous machinery  · Do not make important personal or business decisions  · Do  not drink alcoholic beverages  · If you have not urinated within 8 hours after discharge, please contact your surgeon on call. Report the following to your surgeon:  · Excessive pain, swelling, redness or odor of or around the surgical area  · Temperature over 100.5  · Nausea and vomiting lasting longer than 4 hours or if unable to take medications  · Any signs of decreased circulation or nerve impairment to extremity: change in color, persistent  numbness, tingling, coldness or increase pain  · Any questions    What to do at Home:  Recommended activity: Activity as tolerated, No heavy lifting, pushing, pulling and No driving while on analgesics. Nadeen follow up with Dr. Ten Blackwood Monday 04/20/20 @ [facility]. *  Please give a list of your current medications to your Primary Care Provider. *  Please update this list whenever your medications are discontinued, doses are      changed, or new medications (including over-the-counter products) are added. *  Please carry medication information at all times in case of emergency situations. These are general instructions for a healthy lifestyle:    No smoking/ No tobacco products/ Avoid exposure to second hand smoke  Surgeon General's Warning:  Quitting smoking now greatly reduces serious risk to your health.     Obesity, smoking, and sedentary lifestyle greatly increases your risk for illness    A healthy diet, regular physical exercise & weight monitoring are important for maintaining a healthy lifestyle    You may be retaining fluid if you have a history of heart failure or if you experience any of the following symptoms:  Weight gain of 3 pounds or more overnight or 5 pounds in a week, increased swelling in our hands or feet or shortness of breath while lying flat in bed. Please call your doctor as soon as you notice any of these symptoms; do not wait until your next office visit. The discharge information has been reviewed with the patient. The patient verbalized understanding. Discharge medications reviewed with the patient and appropriate educational materials and side effects teaching were provided.   ___________________________________________________________________________________________________________________________________

## 2020-04-16 NOTE — PROGRESS NOTES
Observation notice provided in writing to patient and/or caregiver as well as verbal explanation of the policy. Patients who are in outpatient status also receive the Observation notice    Spoke with Bebe Miller, entered referral into [facility].     Tiffanie Garcia RN - Outcomes Manager  [phone]

## 2020-04-16 NOTE — PROGRESS NOTES
Discharge instructions given to patient she stated she was just finished talking to SYED Carson and knows where to get her medications, patients verbalized understanding.

## 2020-04-16 NOTE — ROUTINE PROCESS
End of Shift Note Bedside and verbal shift change report given to Pily RN (On coming nurse) by Keven Elizabeth RN (Off going nurse). Report included the following information:  
   --Procedure Summary 
   --MAR, 
   --Recent Results --Med Rec Status SBAR Recommendations: d/c Issues for Provider to address d/c Activity This Shift 
 
 [] Bed Rest Order 
 [] Refused 
 [] Dangled  
 [] TDWB Ambulating: 
   [] Bathroom [x] BSC [] Room/Hallway Up in Chair for meals []Yes [] No  
Voiding       [x] Yes  [] No 
Cowart          [] Yes  [] No 
Incontinent [] Yes  [] No 
 
DUE TO VOID POUR        [] Yes [] No 
Purewick    [] Yes [] No 
New Onset [] Yes [] No Straight Cath   []Yes  [] No 
Condom Cath  [] Yes [] No 
MD Called      [] Yes  [] No  
Blood Sugars Managed []Yes [x] No   
Bowels Moved [] Yes [x] No 
 
Incontinent     [] Yes [] No Passed Gas []Yes [x] No 
 
New Onset  []Yes [] No 
  
 
 MD Called []Yes  [] No 
  
CHG Bath Done Before Surgery After Surgery  
  
[] Yes  [x] No 
[] Yes  [x] No   
  
Drain Removed [] Yes  [] No [x] N/A Dressing Changed [] Yes   [x] No [] N/A Nausea/Vomiting [] Yes   [x] No    
Ice Packs Changed [] Yes   [] No  [x] N/A Incentive Spirometer  [x] Yes  [] No     
SCD Pumps On Ankle Pumping  [x] Yes   [] No  
  
[] Yes   [x] No    
  
Telemetry Monitoring [] Yes   [x] No   Rhythm

## 2020-04-16 NOTE — HOME CARE
Discharge noted for today. Received home health referral for [facility] for SN and PT - post op. Spoke with patient, explained services and answered all questions. Demographics verified. Order processed and emailed to central office. Patient has the following DME: manual wheelchair and rolling walker.  Asha rCuz, [facility] Liaison

## 2020-04-16 NOTE — PROGRESS NOTES
Discharge order noted for today. Pt has been accepted to [employer] agency. Met with patient and she is  agreeable to the transition plan today. Transport has been arranged through a friend. Patient's discharge summary and home health  orders have been forwarded to [facility] home health  agency via [facility]. Updated bedside RN, iPly,  to the transition plan.   Discharge information has been documented on the AVS.       Jacquelin Lima RN - Outcomes Manager  [phone]

## 2020-04-16 NOTE — PROGRESS NOTES
conducted an initial consultation and Spiritual Assessment for Loc Becker, who is a 62 y.o.,female. Patient's Primary Language is: [place]. According to the patient's EMR Synagogue Affiliation is: [facility].     The reason the Patient came to the hospital is:   Patient Active Problem List    Diagnosis Date Noted    Displaced transverse fracture of right patella, initial encounter for closed fracture 04/15/2020    Fibromyalgia 04/15/2020    Patella fracture 04/15/2020    Coronary artery disease involving native coronary artery of native heart 02/07/2017    S/P angioplasty with stent 02/07/2017    Cervical stenosis of spine 02/18/2015        The  provided the following Interventions:  Initiated a relationship of care and support. Explored issues of aylin, belief, spirituality and Hoahaoism/ritual needs while hospitalized. Listened empathically. Provided chaplaincy education. Provided information about Spiritual Care Services. Offered prayer and assurance of continued prayers on patient's behalf. Chart reviewed. The following outcomes where achieved:  Patient shared limited information about both their medical narrative and spiritual journey/beliefs. Patient expressed gratitude for 's visit. Assessment:  Patient does not have any Hoahaoism/cultural needs that will affect patient's preferences in health care. There are no spiritual or Hoahaoism issues which require intervention at this time. Plan:  Chaplains will continue to follow and will provide pastoral care on an as needed/requested basis.  recommends bedside caregivers page  on duty if patient shows signs of acute spiritual or emotional distress.     [id number] [address]   [phone]

## 2020-04-17 ENCOUNTER — HOME CARE VISIT (OUTPATIENT)
Dept: SCHEDULING | Facility: HOME HEALTH | Age: 60
End: 2020-04-17
Payer: COMMERCIAL

## 2020-04-17 ENCOUNTER — HOME CARE VISIT (OUTPATIENT)
Dept: HOME HEALTH SERVICES | Facility: HOME HEALTH | Age: 60
End: 2020-04-17
Payer: COMMERCIAL

## 2020-04-17 VITALS
SYSTOLIC BLOOD PRESSURE: 110 MMHG | TEMPERATURE: 97.4 F | DIASTOLIC BLOOD PRESSURE: 75 MMHG | RESPIRATION RATE: 16 BRPM | HEART RATE: 76 BPM | OXYGEN SATURATION: 97 %

## 2020-04-17 PROCEDURE — 400013 HH SOC

## 2020-04-17 PROCEDURE — G0299 HHS/HOSPICE OF RN EA 15 MIN: HCPCS

## 2020-04-17 PROCEDURE — G0151 HHCP-SERV OF PT,EA 15 MIN: HCPCS

## 2020-04-20 ENCOUNTER — TELEPHONE (OUTPATIENT)
Dept: ORTHOPEDIC SURGERY | Age: 60
End: 2020-04-20

## 2020-04-20 ENCOUNTER — HOME CARE VISIT (OUTPATIENT)
Dept: SCHEDULING | Facility: HOME HEALTH | Age: 60
End: 2020-04-20
Payer: COMMERCIAL

## 2020-04-20 VITALS
HEART RATE: 64 BPM | DIASTOLIC BLOOD PRESSURE: 76 MMHG | OXYGEN SATURATION: 95 % | SYSTOLIC BLOOD PRESSURE: 142 MMHG | TEMPERATURE: 97.6 F

## 2020-04-20 PROCEDURE — G0157 HHC PT ASSISTANT EA 15: HCPCS

## 2020-04-20 NOTE — TELEPHONE ENCOUNTER
Weight bearing as tolerated with straight leg immobilizer intact.  Patient to remove for hygiene, otherwise must wear 24/7,

## 2020-04-20 NOTE — TELEPHONE ENCOUNTER
Post op 4/15/2020    Ede lang/[facility] physical therapy needs to know toe touch weight bearing and goal specifics.     [facility] [address]    X#[phone]

## 2020-04-21 ENCOUNTER — HOME CARE VISIT (OUTPATIENT)
Dept: HOME HEALTH SERVICES | Facility: HOME HEALTH | Age: 60
End: 2020-04-21
Payer: COMMERCIAL

## 2020-04-21 ENCOUNTER — OFFICE VISIT (OUTPATIENT)
Dept: ORTHOPEDIC SURGERY | Age: 60
End: 2020-04-21

## 2020-04-21 VITALS
HEART RATE: 72 BPM | DIASTOLIC BLOOD PRESSURE: 68 MMHG | SYSTOLIC BLOOD PRESSURE: 186 MMHG | HEIGHT: 62 IN | BODY MASS INDEX: 26.52 KG/M2 | TEMPERATURE: 97.2 F

## 2020-04-21 DIAGNOSIS — S82.031D CLOSED DISPLACED TRANSVERSE FRACTURE OF RIGHT PATELLA WITH ROUTINE HEALING, SUBSEQUENT ENCOUNTER: Primary | ICD-10-CM

## 2020-04-21 DIAGNOSIS — G89.18 ACUTE POST-OPERATIVE PAIN: ICD-10-CM

## 2020-04-21 DIAGNOSIS — Z98.890 POST-OPERATIVE STATE: ICD-10-CM

## 2020-04-21 RX ORDER — HYDROCODONE BITARTRATE AND ACETAMINOPHEN 7.5; 325 MG/1; MG/1
1 TABLET ORAL
Qty: 42 TAB | Refills: 0 | Status: SHIPPED | OUTPATIENT
Start: 2020-04-21 | End: 2020-05-05

## 2020-04-21 NOTE — PROGRESS NOTES
HISTORY:  Becka Davila, a pleasant 68-year-old  female returns to the office 1 week status post her left knee open patellar tendon repair. She is doing generally well today. She is at home. She is managing her pain successfully with her opioid analgesic provided at postop discharge. PHYSICAL EXAMINATION:  She is in a straight-leg immobilizer. She is full weightbearing of her right lower extremity. There is noted a bulky dressing over the incision site associated with the anterior knee. The bulky dressing removed and over the anterior portion of the right knee in a cranial/caudal orientation there exists a 14-cm surgical incision intact. Appeared wound border is well approximated. Wound is essentially clean and dry. She does have a trace effusion. Distal sensation intact of the right lower extremity. Capillary refill is brisk and less than 2 seconds to the right lower extremity. RADIOGRAPHS:  X-rays today:  A 2-view of the right knee with straight-leg immobilizer in place reveals straight-leg immobilizer identified associated with the stays. There is also a complexed ORIF construct of the right kneecap including cerclage wiring as well as 2 cancellous cannulated screws. Alignment is acceptable. The midportion of the patella medially is still identified with a fracture component visible. PLAN:  Patient had all the staples removed. Steri-strips were placed with a sterile top cover. A Breg range of motion brace applied at 0 degrees of flexion plus 10 degrees of extension. She is to wear the brace 24/7. She may remove the brace for hygiene purposes, however, should avoid bending the knee during the course of her hygiene. Today all her questions answered to her satisfaction. Continue full weightbearing of the right lower extremity with 4-post rolling walker. Refill of her medication provided today. Patient virtual visit in 2 weeks.

## 2020-04-22 VITALS
HEART RATE: 66 BPM | OXYGEN SATURATION: 96 % | RESPIRATION RATE: 16 BRPM | SYSTOLIC BLOOD PRESSURE: 110 MMHG | DIASTOLIC BLOOD PRESSURE: 80 MMHG | TEMPERATURE: 98.1 F

## 2020-04-23 ENCOUNTER — HOME CARE VISIT (OUTPATIENT)
Dept: HOME HEALTH SERVICES | Facility: HOME HEALTH | Age: 60
End: 2020-04-23
Payer: COMMERCIAL

## 2020-04-23 ENCOUNTER — HOME CARE VISIT (OUTPATIENT)
Dept: SCHEDULING | Facility: HOME HEALTH | Age: 60
End: 2020-04-23
Payer: COMMERCIAL

## 2020-04-23 VITALS
HEART RATE: 58 BPM | DIASTOLIC BLOOD PRESSURE: 86 MMHG | OXYGEN SATURATION: 97 % | TEMPERATURE: 97.4 F | SYSTOLIC BLOOD PRESSURE: 152 MMHG

## 2020-04-23 PROCEDURE — G0151 HHCP-SERV OF PT,EA 15 MIN: HCPCS

## 2020-04-23 PROCEDURE — A6255 ABSORPT DRG >16<=48 IN W/BDR: HCPCS

## 2020-04-24 ENCOUNTER — HOME CARE VISIT (OUTPATIENT)
Dept: SCHEDULING | Facility: HOME HEALTH | Age: 60
End: 2020-04-24
Payer: COMMERCIAL

## 2020-04-27 ENCOUNTER — HOME CARE VISIT (OUTPATIENT)
Dept: SCHEDULING | Facility: HOME HEALTH | Age: 60
End: 2020-04-27
Payer: COMMERCIAL

## 2020-04-27 PROCEDURE — G0157 HHC PT ASSISTANT EA 15: HCPCS

## 2020-04-28 ENCOUNTER — HOME CARE VISIT (OUTPATIENT)
Dept: HOME HEALTH SERVICES | Facility: HOME HEALTH | Age: 60
End: 2020-04-28
Payer: COMMERCIAL

## 2020-04-28 VITALS
HEART RATE: 56 BPM | OXYGEN SATURATION: 95 % | DIASTOLIC BLOOD PRESSURE: 86 MMHG | TEMPERATURE: 97.3 F | SYSTOLIC BLOOD PRESSURE: 158 MMHG

## 2020-04-29 ENCOUNTER — HOME CARE VISIT (OUTPATIENT)
Dept: SCHEDULING | Facility: HOME HEALTH | Age: 60
End: 2020-04-29
Payer: COMMERCIAL

## 2020-04-29 VITALS
OXYGEN SATURATION: 96 % | SYSTOLIC BLOOD PRESSURE: 138 MMHG | DIASTOLIC BLOOD PRESSURE: 76 MMHG | TEMPERATURE: 97.4 F | HEART RATE: 63 BPM | RESPIRATION RATE: 20 BRPM

## 2020-04-29 PROCEDURE — G0157 HHC PT ASSISTANT EA 15: HCPCS

## 2020-05-01 ENCOUNTER — HOME CARE VISIT (OUTPATIENT)
Dept: HOME HEALTH SERVICES | Facility: HOME HEALTH | Age: 60
End: 2020-05-01
Payer: COMMERCIAL

## 2020-05-05 ENCOUNTER — VIRTUAL VISIT (OUTPATIENT)
Dept: ORTHOPEDIC SURGERY | Age: 60
End: 2020-05-05

## 2020-05-05 DIAGNOSIS — S82.031D CLOSED DISPLACED TRANSVERSE FRACTURE OF RIGHT PATELLA WITH ROUTINE HEALING, SUBSEQUENT ENCOUNTER: Primary | ICD-10-CM

## 2020-05-05 DIAGNOSIS — Z98.890 POST-OPERATIVE STATE: ICD-10-CM

## 2020-05-06 ENCOUNTER — HOME CARE VISIT (OUTPATIENT)
Dept: HOME HEALTH SERVICES | Facility: HOME HEALTH | Age: 60
End: 2020-05-06
Payer: COMMERCIAL

## 2020-05-07 ENCOUNTER — HOME CARE VISIT (OUTPATIENT)
Dept: SCHEDULING | Facility: HOME HEALTH | Age: 60
End: 2020-05-07
Payer: COMMERCIAL

## 2020-05-07 VITALS
OXYGEN SATURATION: 96 % | HEART RATE: 59 BPM | SYSTOLIC BLOOD PRESSURE: 130 MMHG | DIASTOLIC BLOOD PRESSURE: 74 MMHG | TEMPERATURE: 98.3 F

## 2020-05-07 PROCEDURE — G0151 HHCP-SERV OF PT,EA 15 MIN: HCPCS

## 2020-05-28 ENCOUNTER — OFFICE VISIT (OUTPATIENT)
Dept: ORTHOPEDIC SURGERY | Facility: CLINIC | Age: 60
End: 2020-05-28

## 2020-05-28 VITALS
WEIGHT: 147.4 LBS | BODY MASS INDEX: 27.12 KG/M2 | HEART RATE: 58 BPM | DIASTOLIC BLOOD PRESSURE: 76 MMHG | SYSTOLIC BLOOD PRESSURE: 162 MMHG | HEIGHT: 62 IN | TEMPERATURE: 97.5 F

## 2020-05-28 DIAGNOSIS — S82.031D CLOSED DISPLACED TRANSVERSE FRACTURE OF RIGHT PATELLA WITH ROUTINE HEALING, SUBSEQUENT ENCOUNTER: ICD-10-CM

## 2020-05-28 DIAGNOSIS — Z98.890 POST-OPERATIVE STATE: Primary | ICD-10-CM

## 2020-06-08 ENCOUNTER — OFFICE VISIT (OUTPATIENT)
Dept: ORTHOPEDIC SURGERY | Facility: CLINIC | Age: 60
End: 2020-06-08

## 2020-06-08 VITALS
TEMPERATURE: 97.3 F | DIASTOLIC BLOOD PRESSURE: 76 MMHG | HEIGHT: 62 IN | WEIGHT: 149 LBS | SYSTOLIC BLOOD PRESSURE: 155 MMHG | HEART RATE: 58 BPM | BODY MASS INDEX: 27.42 KG/M2

## 2020-06-08 DIAGNOSIS — S82.031D CLOSED DISPLACED TRANSVERSE FRACTURE OF RIGHT PATELLA WITH ROUTINE HEALING, SUBSEQUENT ENCOUNTER: Primary | ICD-10-CM

## 2020-06-08 NOTE — PROGRESS NOTES
HISTORY:  Lila Escudero, a pleasant 68-year-old  female returns to the office 7 week status post her left knee open patellar tendon repair. She is doing generally well today. She is at home. She is managing her pain successfully with her opioid analgesic provided at postop discharge. PHYSICAL EXAMINATION:  She is in a straight-leg ROM immobilizer open to 50 degrees flexion and full extension. She is full weightbearing of her right lower extremity. .  ROM brace removed and over the anterior portion of the right knee in a cranial/caudal orientation there exists a 14-cm surgical incision intact. Appeared wound border is well approximated. Wound is essentially clean and dry. She does have a trace effusion. Distal sensation intact of the right lower extremity. Capillary refill is brisk and less than 2 seconds to the right lower extremity. RADIOGRAPHS:  X-rays today:  A 2-view of the right knee with straight-leg immobilizer in place reveals straight-leg immobilizer identified associated with the stays. There is also a complexed ORIF construct of the right kneecap including cerclage wiring as well as 2 cancellous cannulated screws. Alignment is acceptable. The midportion of the patella medially is still identified with a fracture component visible. PLAN:  Patient had all the staples removed. Steri-strips were placed with a sterile top cover. A Breg range of motion brace applied at 70 degrees of flexion plus 10 degrees of extension. She is to wear the brace when up. She may remove the brace for hygiene purposes, however, should avoid bending the knee during the course of her hygiene. Today all her questions answered to her satisfaction. Continue full weightbearing of the right lower extremity with 4-post rolling walker. Patient virtual visit in  days.

## 2020-06-22 ENCOUNTER — OFFICE VISIT (OUTPATIENT)
Dept: ORTHOPEDIC SURGERY | Facility: CLINIC | Age: 60
End: 2020-06-22

## 2020-06-22 VITALS
SYSTOLIC BLOOD PRESSURE: 151 MMHG | WEIGHT: 148 LBS | BODY MASS INDEX: 27.23 KG/M2 | HEIGHT: 62 IN | TEMPERATURE: 97.6 F | DIASTOLIC BLOOD PRESSURE: 83 MMHG | HEART RATE: 55 BPM

## 2020-06-22 DIAGNOSIS — S82.031D CLOSED DISPLACED TRANSVERSE FRACTURE OF RIGHT PATELLA WITH ROUTINE HEALING, SUBSEQUENT ENCOUNTER: Primary | ICD-10-CM

## 2020-06-22 NOTE — PROGRESS NOTES
Pt reports burning feeling at anterior knee. States she does not have feeling superior lateral to patella.

## 2020-06-22 NOTE — PROGRESS NOTES
HISTORY:  Angela Singh, a pleasant 26-year-old  female returns to the office 9 week status post her right patella fracture with repair. She is doing generally well today. She is at home. She is full weight bearing RLE      PHYSICAL EXAMINATION:  She is in a straight-leg ROM immobilizer open to 50 degrees flexion and full extension. She is full weightbearing of her right lower extremity. .  ROM brace removed and over the anterior portion of the right knee in a cranial/caudal orientation there exists a 14-cm surgical incision intact. Appeared wound border is well approximated. Wound is essentially clean and dry. She does have a trace effusion. Distal sensation intact of the right lower extremity. Capillary refill is brisk and less than 2 seconds to the right lower extremity. Weakness Right Thigh against resistance 3+/5     RADIOGRAPHS:  X-rays today:  A 2-view of the right knee with straight-leg immobilizer in place reveals straight-leg immobilizer identified associated with the stays. There is also a complexed ORIF construct of the right kneecap including cerclage wiring as well as 2 cancellous cannulated screws. Alignment is acceptable. The midportion of the patella medially reflects callous inlay yet there is an identified with a fracture component visible. PLAN:  DC Brace, FWBAT RLE, F/u 1 month, PT OP ROM gentle strengthening.

## 2020-06-29 ENCOUNTER — HOSPITAL ENCOUNTER (OUTPATIENT)
Dept: PHYSICAL THERAPY | Age: 60
Discharge: HOME | End: 2020-06-29
Payer: COMMERCIAL

## 2020-06-29 PROCEDURE — 97110 THERAPEUTIC EXERCISES: CPT

## 2020-06-29 PROCEDURE — 97530 THERAPEUTIC ACTIVITIES: CPT

## 2020-06-29 PROCEDURE — 97161 PT EVAL LOW COMPLEX 20 MIN: CPT

## 2020-06-29 NOTE — PROGRESS NOTES
PT DAILY TREATMENT NOTE 10-18    Patient Name: Padmini Davis  Date:2020  : 1960  [x]  Patient  Verified  Payor: BLUE CROSS / Plan: [address] [place] [address] / Product Type: PPO /    In time:9:36  Out time:10:46  Total Treatment Time (min): 40  Visit #: 1 of 10    Medicare/BCBS Only   Total Timed Codes (min):  25 1:1 Treatment Time:  40       Treatment Area: Displaced transverse fracture of right patella, subsequent encounter for closed fracture with routine healing [S82.031D]    SUBJECTIVE  Pain Level (0-10 scale): 3  Any medication changes, allergies to medications, adverse drug reactions, diagnosis change, or new procedure performed?: [x] No    [] Yes (see summary sheet for update)  Subjective functional status/changes:   [] No changes reported  See POC    OBJECTIVE    15 min [x]Eval                  []Re-Eval       15 min Therapeutic Exercise:  [] See flow sheet :   Rationale: increase ROM and increase strength to improve the patients ability to independently manage symptoms. 10 min Therapeutic Activity:  []  See flow sheet : Patient education on therapy assessment, prognosis, expectations for therapy sessions, patient goals, importance of informing therapy about increased pain levels with therapy, and HEP. Rationale: to improve the patients ability to adhere to HEP and therapy sessions for increased compliance when working toward therapy goals.              With   [] TE   [x] TA   [] neuro   [] other: Patient Education: [x] Review HEP    [] Progressed/Changed HEP based on:   [] positioning   [] body mechanics   [] transfers   [] heat/ice application    [] other:      Other Objective/Functional Measures: See POC     Pain Level (0-10 scale) post treatment: 3    ASSESSMENT/Changes in Function: See POC    Patient will continue to benefit from skilled PT services to modify and progress therapeutic interventions, address functional mobility deficits, address ROM deficits, address strength deficits, analyze and address soft tissue restrictions, analyze and cue movement patterns, analyze and modify body mechanics/ergonomics, assess and modify postural abnormalities, address imbalance/dizziness and instruct in home and community integration to attain remaining goals.      [x]  See Plan of Care  []  See progress note/recertification  []  See Discharge Summary         Progress towards goals / Updated goals:  See POC    PLAN  [x]  Upgrade activities as tolerated     []  Continue plan of care  [x]  Update interventions per flow sheet       []  Discharge due to:_  []  Other:_      Mily Onofre 6/29/2020  10:21 AM    Future Appointments   Date Time Provider Ioana Werner   7/13/2020  1:30 PM ADRYAN Pierce [address] 67

## 2020-06-29 NOTE — PROGRESS NOTES
[facility]  [employer] [facility]  [address] [place] [address]  [phone] fax    Plan of Care/ Statement of Necessity for Physical Therapy Services    Patient name: Bela Coates Start of Care: 2020   Referral source: Nam Ortiz : 1960    Medical Diagnosis: Displaced transverse fracture of right patella, subsequent encounter for closed fracture with routine healing [S82.031D]  Payor: BLUE CROSS / Plan: [address] [place] [address] / Product Type: PPO /  Onset Date:4/15/20    Treatment Diagnosis: right knee pain   Prior Hospitalization: see medical history Provider#: [id number]   Medications: Verified on Patient summary List    Comorbidities: HTN, osteoporosis, hx of lumbar spine surgery   Prior Level of Function: functionally independent, managing two-story home independently, lives with granddaughter     The Plan of Care and following information is based on the information from the initial evaluation. Assessment/ key information: Pt is a pleasant 56 y.o. female who presents with c/o right knee pain. The patient reports an acute onset of right knee pain following fall onto her right knee on 20; further imaging displayed right patellar fracture which was surgically repaired on 4/15/20 with ORIF. The patient reports discharge from knee immobilizer brace on 20 with instruction for full WBAT. Signs/symptoms at eval consistent with expected recovery process following right patellar fracture. Functional deficits include: decreased right knee AROM, decreased right knee extension strength, impaired gait, decreased standing tolerance, and difficulty with stair negotiation. Rehab potential is good due to desire to return to [facility]. Pt would benefit from skilled PT to address above deficits to improve Pt's function and ability to return to PLOF household tasks with decreased pain and improved function.     Evaluation Complexity History MEDIUM Complexity : 1-2 comorbidities / personal factors will impact the outcome/ POC ; Examination LOW Complexity : 1-2 Standardized tests and measures addressing body structure, function, activity limitation and / or participation in recreation  ;Presentation LOW Complexity : Stable, uncomplicated  ;Clinical Decision Making MEDIUM Complexity : FOTO score of 26-74  Overall Complexity Rating: LOW   Problem List: pain affecting function, decrease ROM, decrease strength, edema affecting function, impaired gait/ balance, decrease ADL/ functional abilitiies, decrease activity tolerance, decrease flexibility/ joint mobility and decrease transfer abilities   Treatment Plan may include any combination of the following: Therapeutic exercise, Therapeutic activities, Neuromuscular re-education, Physical agent/modality, Gait/balance training, Manual therapy, Aquatic therapy, Patient education, Self Care training, Functional mobility training, Home safety training and Stair training  Patient / Family readiness to learn indicated by: asking questions  Persons(s) to be included in education: patient (P)  Barriers to Learning/Limitations: None  Patient Goal (s): improve range of motion, strengthening  Patient Self Reported Health Status: good  Rehabilitation Potential: good    Short Term Goals: To be accomplished in 1 week  - Goal: Pt to be compliant with initial HEP to improve right knee AAROM and strength to increase ease of gait activities. Status at last note/certification: Established and reviewed with Pt  Long Term Goals: To be accomplished in 10 treatments  - Goal: Pt to demonstrate right knee extension MMT of 4+/5 without increased pain for improved ease of ADLs.   Status at last note/certification: 3+/5 with increased pain  - Goal: Patient will demonstrate 0-125 degrees AROM right knee to increase ease of ADLs  Status at last note/certification: right knee 0-97 degrees AROM  - Goal: Patient will ascend and descend 12 steps with reciprocal pattern to increase ease of entry onto second floor of home. Status at last note/certification: negotiating 12 steps to second floor of home with left LE performing all weight bearing    - Goal: Patient will tolerate ambulation time of 30 min without symptom exacerbation with improved right knee flexion during right stance phase to facilitate improved community ambulation. Status at last note/certification: increased pain with ambulation; right knee pain and stiffness limits right knee flexion in stance   - Goal: Patient will increase FOTO score to at least 68 pts to demonstrate increased functional mobility. Status at last note/certification: FOTO 54 pts       Frequency / Duration: Patient to be seen 2-3 times per week for 10 treatments. Patient/ CarPatient/ Caregiver education and instruction: Diagnosis, prognosis, activity modification and exercises   [x]  Plan of care has been reviewed with CAROLINA Soto 6/29/2020 10:22 AM    ________________________________________________________________________    I certify that the above Therapy Services are being furnished while the patient is under my care. I agree with the treatment plan and certify that this therapy is necessary.     Physician's Signature:____________Date:_________TIME:________    ** Signature, Date and Time must be completed for valid certification **    Please sign and return to [facility]  [employer] [facility]  [address] [place] [address]  [phone] fax

## 2020-08-27 NOTE — PROGRESS NOTES
[facility] [address]  [address] [facility]  [phone] fax    Physical Therapy Discharge Summary    Patient name: Carrie Napoles Start of Care: 2020   Referral source: Leopoldo Player : 1960                Medical Diagnosis: Displaced transverse fracture of right patella, subsequent encounter for closed fracture with routine healing [S82.031D]  Payor: BLUE CROSS / Plan: [address] [place] [address] / Product Type: PPO /  Onset Date:4/15/20                Treatment Diagnosis: right knee pain   Prior Hospitalization: see medical history Provider#: [id number]   Medications: Verified on Patient summary List    Comorbidities: HTN, osteoporosis, hx of lumbar spine surgery   Prior Level of Function: functionally independent, managing two-story home independently, lives with granddaughter                Visits from Start of Care: 1    Missed Visits: 0    Reporting Period :  20 to  20    Summary of Care:  Goal: Pt to demonstrate right knee extension MMT of 4+/5 without increased pain for improved ease of ADLs. Status at last note/certification: 3+/5 with increased pain  Status at discharge: not met    Goal: Patient will demonstrate 0-125 degrees AROM right knee to increase ease of ADLs  Status at last note/certification: right knee 0-97 degrees AROM  Status at discharge: not met    Goal: Patient will ascend and descend 12 steps with reciprocal pattern to increase ease of entry onto second floor of home. Status at last note/certification: negotiating 12 steps to second floor of home with left LE performing all weight bearing    Status at discharge: not met    Goal: Patient will tolerate ambulation time of 30 min without symptom exacerbation with improved right knee flexion during right stance phase to facilitate improved community ambulation.   Status at last note/certification: increased pain with ambulation; right knee pain and stiffness limits right knee flexion in stance   Status at discharge: not met    Goal: Patient will increase FOTO score to at least 68 pts to demonstrate increased functional mobility. Status at last note/certification: FOTO 54 pts   Status at discharge: not met    Pt. Was seen for initial evaluation and then did not return to PT. Goals were unable to be re-assessed secondary to unplanned D/C. She was provided with a HEP at evaluation.        ASSESSMENT/RECOMMENDATIONS:  [x]Discontinue therapy: []Patient has reached or is progressing toward set goals      [x]Patient is non-compliant or has abdicated      []Due to lack of appreciable progress towards set goals    Londell Bamberger, PT 8/27/2020 2:51 PM

## 2021-02-26 NOTE — PROGRESS NOTES
HISTORY:  Becka Davila, a pleasant 56-year-old  female returns to the office 5 week status post her left knee open patellar tendon repair. She is doing generally well today. She is at home. She is managing her pain successfully with her opioid analgesic provided at postop discharge. PHYSICAL EXAMINATION:  She is in a straight-leg ROM immobilizer locked at + 10 degrees extension. She is full weightbearing of her right lower extremity. There is noted a bulky dressing over the incision site associated with the anterior knee. ROM brace removed and over the anterior portion of the right knee in a cranial/caudal orientation there exists a 14-cm surgical incision intact. Appeared wound border is well approximated. Wound is essentially clean and dry. She does have a trace effusion. Distal sensation intact of the right lower extremity. Capillary refill is brisk and less than 2 seconds to the right lower extremity. RADIOGRAPHS:  X-rays today:  A 2-view of the right knee with straight-leg immobilizer in place reveals straight-leg immobilizer identified associated with the stays. There is also a complexed ORIF construct of the right kneecap including cerclage wiring as well as 2 cancellous cannulated screws. Alignment is acceptable. The midportion of the patella medially is still identified with a fracture component visible. PLAN:  Patient had all the staples removed. Steri-strips were placed with a sterile top cover. A Breg range of motion brace applied at 50 degrees of flexion plus 10 degrees of extension. She is to wear the brace when up. She may remove the brace for hygiene purposes, however, should avoid bending the knee during the course of her hygiene. Today all her questions answered to her satisfaction. Continue full weightbearing of the right lower extremity with 4-post rolling walker. Patient virtual visit in 2 10 days. Minoxidil Pregnancy And Lactation Text: This medication has not been assigned a Pregnancy Risk Category but animal studies failed to show danger with the topical medication. It is unknown if the medication is excreted in breast milk.

## 2021-03-28 ENCOUNTER — APPOINTMENT (OUTPATIENT)
Dept: GENERAL RADIOLOGY | Age: 61
End: 2021-03-28
Attending: PHYSICIAN ASSISTANT
Payer: MEDICARE

## 2021-03-28 ENCOUNTER — HOSPITAL ENCOUNTER (OUTPATIENT)
Age: 61
Setting detail: OBSERVATION
Discharge: HOME | End: 2021-03-29
Attending: EMERGENCY MEDICINE | Admitting: INTERNAL MEDICINE
Payer: MEDICARE

## 2021-03-28 ENCOUNTER — APPOINTMENT (OUTPATIENT)
Dept: CT IMAGING | Age: 61
End: 2021-03-28
Attending: PHYSICIAN ASSISTANT
Payer: MEDICARE

## 2021-03-28 DIAGNOSIS — I25.10 CORONARY ARTERY DISEASE INVOLVING NATIVE CORONARY ARTERY OF NATIVE HEART WITHOUT ANGINA PECTORIS: Chronic | ICD-10-CM

## 2021-03-28 DIAGNOSIS — R07.9 CHEST PAIN, UNSPECIFIED TYPE: ICD-10-CM

## 2021-03-28 DIAGNOSIS — I10 ESSENTIAL HYPERTENSION: ICD-10-CM

## 2021-03-28 PROBLEM — I16.0 HYPERTENSIVE URGENCY: Status: ACTIVE | Noted: 2021-03-28

## 2021-03-28 LAB
ALBUMIN SERPL-MCNC: 3.5 G/DL (ref 3.4–5)
ALBUMIN/GLOB SERPL: 0.9 {RATIO} (ref 0.8–1.7)
ALP SERPL-CCNC: 121 U/L (ref 45–117)
ALT SERPL-CCNC: 38 U/L (ref 13–56)
ANION GAP SERPL CALC-SCNC: 8 MMOL/L (ref 3–18)
AST SERPL-CCNC: 16 U/L (ref 10–38)
BASOPHILS # BLD: 0 K/UL (ref 0–0.1)
BASOPHILS NFR BLD: 0 % (ref 0–2)
BILIRUB SERPL-MCNC: 0.3 MG/DL (ref 0.2–1)
BUN SERPL-MCNC: 14 MG/DL (ref 7–18)
BUN/CREAT SERPL: 27 (ref 12–20)
CALCIUM SERPL-MCNC: 8.8 MG/DL (ref 8.5–10.1)
CHLORIDE SERPL-SCNC: 107 MMOL/L (ref 100–111)
CK MB CFR SERPL CALC: NORMAL % (ref 0–4)
CK MB SERPL-MCNC: <1 NG/ML (ref 5–25)
CK SERPL-CCNC: 50 U/L (ref 26–192)
CO2 SERPL-SCNC: 26 MMOL/L (ref 21–32)
CREAT SERPL-MCNC: 0.52 MG/DL (ref 0.6–1.3)
DIFFERENTIAL METHOD BLD: ABNORMAL
EOSINOPHIL # BLD: 0.2 K/UL (ref 0–0.4)
EOSINOPHIL NFR BLD: 2 % (ref 0–5)
ERYTHROCYTE [DISTWIDTH] IN BLOOD BY AUTOMATED COUNT: 12.6 % (ref 11.6–14.5)
GLOBULIN SER CALC-MCNC: 3.8 G/DL (ref 2–4)
GLUCOSE SERPL-MCNC: 105 MG/DL (ref 74–99)
HCT VFR BLD AUTO: 42.4 % (ref 35–45)
HGB BLD-MCNC: 14.2 G/DL (ref 12–16)
LYMPHOCYTES # BLD: 4 K/UL (ref 0.9–3.6)
LYMPHOCYTES NFR BLD: 38 % (ref 21–52)
MCH RBC QN AUTO: 31.1 PG (ref 24–34)
MCHC RBC AUTO-ENTMCNC: 33.5 G/DL (ref 31–37)
MCV RBC AUTO: 93 FL (ref 74–97)
MONOCYTES # BLD: 1 K/UL (ref 0.05–1.2)
MONOCYTES NFR BLD: 10 % (ref 3–10)
NEUTS SEG # BLD: 5.4 K/UL (ref 1.8–8)
NEUTS SEG NFR BLD: 50 % (ref 40–73)
PLATELET # BLD AUTO: 356 K/UL (ref 135–420)
PMV BLD AUTO: 8.8 FL (ref 9.2–11.8)
POTASSIUM SERPL-SCNC: 3.6 MMOL/L (ref 3.5–5.5)
PROT SERPL-MCNC: 7.3 G/DL (ref 6.4–8.2)
RBC # BLD AUTO: 4.56 M/UL (ref 4.2–5.3)
SODIUM SERPL-SCNC: 141 MMOL/L (ref 136–145)
TROPONIN I SERPL-MCNC: <0.02 NG/ML (ref 0–0.04)
TROPONIN I SERPL-MCNC: <0.02 NG/ML (ref 0–0.04)
WBC # BLD AUTO: 10.7 K/UL (ref 4.6–13.2)

## 2021-03-28 PROCEDURE — 96374 THER/PROPH/DIAG INJ IV PUSH: CPT

## 2021-03-28 PROCEDURE — 71045 X-RAY EXAM CHEST 1 VIEW: CPT

## 2021-03-28 PROCEDURE — 80053 COMPREHEN METABOLIC PANEL: CPT

## 2021-03-28 PROCEDURE — 65660000000 HC RM CCU STEPDOWN

## 2021-03-28 PROCEDURE — 93005 ELECTROCARDIOGRAM TRACING: CPT

## 2021-03-28 PROCEDURE — 74011250637 HC RX REV CODE- 250/637: Performed by: EMERGENCY MEDICINE

## 2021-03-28 PROCEDURE — 74174 CTA ABD&PLVS W/CONTRAST: CPT

## 2021-03-28 PROCEDURE — 96372 THER/PROPH/DIAG INJ SC/IM: CPT

## 2021-03-28 PROCEDURE — 99218 HC RM OBSERVATION: CPT

## 2021-03-28 PROCEDURE — 96375 TX/PRO/DX INJ NEW DRUG ADDON: CPT

## 2021-03-28 PROCEDURE — 74011250636 HC RX REV CODE- 250/636: Performed by: EMERGENCY MEDICINE

## 2021-03-28 PROCEDURE — 74011250637 HC RX REV CODE- 250/637: Performed by: PHYSICIAN ASSISTANT

## 2021-03-28 PROCEDURE — 85025 COMPLETE CBC W/AUTO DIFF WBC: CPT

## 2021-03-28 PROCEDURE — 74011000636 HC RX REV CODE- 636: Performed by: EMERGENCY MEDICINE

## 2021-03-28 PROCEDURE — 99285 EMERGENCY DEPT VISIT HI MDM: CPT

## 2021-03-28 PROCEDURE — 82550 ASSAY OF CK (CPK): CPT

## 2021-03-28 PROCEDURE — 74011250636 HC RX REV CODE- 250/636: Performed by: PHYSICIAN ASSISTANT

## 2021-03-28 RX ORDER — LABETALOL HCL 20 MG/4 ML
20 SYRINGE (ML) INTRAVENOUS ONCE
Status: COMPLETED | OUTPATIENT
Start: 2021-03-28 | End: 2021-03-28

## 2021-03-28 RX ORDER — LISINOPRIL 10 MG/1
10 TABLET ORAL DAILY
COMMUNITY
End: 2021-03-29

## 2021-03-28 RX ORDER — GUAIFENESIN 100 MG/5ML
324 LIQUID (ML) ORAL
Status: COMPLETED | OUTPATIENT
Start: 2021-03-28 | End: 2021-03-28

## 2021-03-28 RX ORDER — HEPARIN SODIUM 5000 [USP'U]/ML
5000 INJECTION, SOLUTION INTRAVENOUS; SUBCUTANEOUS EVERY 12 HOURS
Status: DISCONTINUED | OUTPATIENT
Start: 2021-03-28 | End: 2021-03-29 | Stop reason: HOSPADM

## 2021-03-28 RX ORDER — MORPHINE SULFATE 2 MG/ML
2 INJECTION, SOLUTION INTRAMUSCULAR; INTRAVENOUS
Status: COMPLETED | OUTPATIENT
Start: 2021-03-28 | End: 2021-03-28

## 2021-03-28 RX ORDER — METOPROLOL TARTRATE 50 MG/1
50 TABLET ORAL
Status: COMPLETED | OUTPATIENT
Start: 2021-03-28 | End: 2021-03-28

## 2021-03-28 RX ORDER — DIPHENHYDRAMINE HYDROCHLORIDE 50 MG/ML
25 INJECTION, SOLUTION INTRAMUSCULAR; INTRAVENOUS ONCE
Status: COMPLETED | OUTPATIENT
Start: 2021-03-28 | End: 2021-03-28

## 2021-03-28 RX ADMIN — NITROGLYCERIN 0.5 INCH: 20 OINTMENT TOPICAL at 20:55

## 2021-03-28 RX ADMIN — ASPIRIN 324 MG: 81 TABLET, CHEWABLE ORAL at 20:54

## 2021-03-28 RX ADMIN — MORPHINE SULFATE 2 MG: 2 INJECTION, SOLUTION INTRAMUSCULAR; INTRAVENOUS at 17:17

## 2021-03-28 RX ADMIN — HEPARIN SODIUM 5000 UNITS: 5000 INJECTION INTRAVENOUS; SUBCUTANEOUS at 21:02

## 2021-03-28 RX ADMIN — IOPAMIDOL 100 ML: 755 INJECTION, SOLUTION INTRAVENOUS at 18:25

## 2021-03-28 RX ADMIN — METOPROLOL TARTRATE 50 MG: 50 TABLET, FILM COATED ORAL at 22:45

## 2021-03-28 RX ADMIN — LABETALOL HYDROCHLORIDE 20 MG: 5 INJECTION, SOLUTION INTRAVENOUS at 17:16

## 2021-03-28 RX ADMIN — DIPHENHYDRAMINE HYDROCHLORIDE 25 MG: 50 INJECTION, SOLUTION INTRAMUSCULAR; INTRAVENOUS at 18:00

## 2021-03-28 NOTE — ED TRIAGE NOTES
Chest pain to upper left chest  Area x3 days, constant  Described as dull and sharp  Patient unsure what she was doing when it started. Pain improved with Tylenol, heat, and cold.    No cough  No fevers  No shortness of breath

## 2021-03-28 NOTE — ED NOTES
Assumed care. Introduced myself as Primary Nurse. Encouraged to voice any concerns and/or change in their condition. Call bell in easy reach. Patient's fall risk assessed, bed locked and in lowest position. Lights are on in room and area around bed dry and free of clutter. Informed staff will be making rounds every half hour or sooner if condition warrants. Patient knows that as soon as MD reviews results he will be in to update patient. Current CDC guidelines followed upon entering room: Mask, goggles and gloves worn when entering room. Strict handwashing observed after removing gloves after this encounter with patient.

## 2021-03-28 NOTE — ED PROVIDER NOTES
[address] EMERGENCY DEPT    Date: 3/28/2021  Patient Name: Ivana Ferguson    History of Presenting Illness     Chief Complaint   Patient presents with    Chest Pain (Angina)     56 y.o. female smoker with a PMH of Anxiety, CAD with Stent placement , DM, GERD, and HTN presents the ED complaining of chest pain onset 3 days ago. Patient describes having a constant aching discomfort that is mild in her left chest that intermittently becomes sharp and stabbing. She notes having radiation of pain through to her left upper back as well as a tingling sensation in her left hand. Father  of a heart attack at age 72. Pt reports having severe anxiety at this time as well. She denies hemoptysis, leg pain/swelling, abd pain, SOB, or other symptoms at this time. Patient denies any other associated signs or symptoms. Patient denies any other complaints. Nursing notes regarding the HPI and triage nursing notes were reviewed. Prior medical records were reviewed. Current Outpatient Medications   Medication Sig Dispense Refill    lisinopriL (PRINIVIL, ZESTRIL) 10 mg tablet Take 10 mg by mouth daily.  atorvastatin (LIPITOR) 20 mg tablet Take  by mouth daily.  aspirin delayed-release 81 mg tablet Take  by mouth daily.  METOPROLOL TARTRATE (LOPRESSOR PO) Take 50 mg by mouth two (2) times a day.  escitalopram oxalate (LEXAPRO) 5 mg tablet Take 10 mg by mouth daily.  promethazine (PHENERGAN) 25 mg tablet Take 1 Tab by mouth every eight (8) hours as needed for Nausea. Indications: prevent nausea and vomiting after surgery 20 Tab 0    senna-docusate (PERICOLACE) 8.6-50 mg per tablet Take 1 Tab by mouth two (2) times a day.  Indications: constipation 30 Tab 0       Past History     Past Medical History:  Past Medical History:   Diagnosis Date    Anxiety     Arthritis     CAD (coronary artery disease)     ONE VESSEL STENT 3/2017 [facility] Cervical stenosis of spine  Coronary artery disease involving native coronary artery of native heart 2017    Deficiency anemia     Depression     Diabetes (HCC)     Diet controlled (per pt.)    Esophageal reflux     FOOD RELATED HEART BURN     Exercise tolerance finding 07/2018    WALKS 20 MINUTES 2 DAYS, HOUSE AND YARD WORK, CAN CLIMBS STAIRS AND LAY FLAT-- DENIES SOB OR CHEST PAINS    Fibromyalgia     GERD (gastroesophageal reflux disease)     Gross hematuria     HLD (hyperlipidemia)     Hypercholesteremia     Hypertension     Kidney stone     Mitral valve prolapse     NO PROBLEMS     PONV (postoperative nausea and vomiting)     Right kidney stone     S/P angioplasty with stent 2017    Cath 2/7/17, [facility] Lety Land[facility], [facility]    Smoker     Wears glasses        Past Surgical History:  Past Surgical History:   Procedure Laterality Date    HX CHOLECYSTECTOMY      HX CORONARY STENT PLACEMENT  03/2017    ONE VESSEL STENT    HX OTHER SURGICAL      SPINE LO cervical spine    HX REFRACTIVE SURGERY Bilateral     HX SHOULDER ARTHROSCOPY Right 07/2018    HX TUBAL LIGATION Bilateral     HX UROLOGICAL  05/10/2019    S/p cystoscopy, exchange of JJ stent, right URS, laser lithotripsy, and removal of stone by Dr. Omari Dugan at [facility]       Family History:  History reviewed. No pertinent family history. Social History:  Social History     Tobacco Use    Smoking status: Current Every Day Smoker     Packs/day: 1.00     Years: 30.00     Pack years: 30.00     Types: Cigarettes    Smokeless tobacco: Never Used   Substance Use Topics    Alcohol use: No    Drug use: No       Allergies: Allergies   Allergen Reactions    Oxycodone-Acetaminophen Other (comments) and Itching       Patient's primary care provider (as noted in EPIC):  Connie Pretty MD    Review of Systems   Constitutional:  Denies malaise, fever, chills. Neck:  Denies injury or pain. Chest:  Denies injury. Cardiac: + left sided chest pain.    Respiratory:  Denies cough, wheezing, difficulty breathing, shortness of breath. GI/ABD:  Denies injury, pain, distention, nausea, vomiting, diarrhea. Back: + left superior chest pain. Pelvis:  Denies injury or pain. Extremity/MS:  Denies injury or pain. Neuro: + tingling to left hand. Denies headache, LOC, dizziness. Skin: Denies injury, rash, itching or skin changes. All other systems negative as reviewed. Visit Vitals  BP (!) 161/60   Pulse 65   Temp 97 °F (36.1 °C)   Resp 12   Ht 5' 2\" (1.575 m)   Wt 68.9 kg (152 lb)   SpO2 97%   BMI 27.80 kg/m²       PHYSICAL EXAM:    CONSTITUTIONAL:  Alert, anxious appearing;  well developed;  well nourished. HEAD:  Normocephalic, atraumatic. EYES:  EOMI. Non-icteric sclera. Normal conjunctiva. NECK:  Supple  RESPIRATORY:  Chest clear, equal breath sounds, good air movement. Without wheezes, rhonchi or rales. CARDIOVASCULAR:  Regular rate and rhythm. No murmurs, rubs, or lopez  Chest:  No rash, lesions, bruising. Focal left chest reproducible tenderness to palpation. GI:  Normal bowel sounds, abdomen soft and non-tender. No rebound or guarding. BACK:  Non-tender. UPPER EXT:  Normal inspection. LOWER EXT:  No edema. NEURO:  Moves all four extremities, and grossly normal motor exam.  SKIN:  No rashes;  Normal for age. PSYCH:  Alert and normal affect. DIFFERENTIAL DIAGNOSES/ MEDICAL DECISION MAKING:  Chest pain etiologies include acute cardiac events to include possible acute myocardial infarction, acute coronary syndrome, pneumonia, chest wall pain (myofascial/ musculoskeletal etiology), chronic obstructive pulmonary disease (copd), acute asthma exacerbation, congestive heart failure, acute bronchitis, pulmonary embolism, upper respiratory infection, referred abdominal pain, other etiologies, versus combination of the above.     ED COURSE:    Recent Results (from the past 12 hour(s))   EKG, 12 LEAD, INITIAL    Collection Time: 03/28/21  4:55 PM   Result Value Ref Range Ventricular Rate 59 BPM    Atrial Rate 59 BPM    P-R Interval 142 ms    QRS Duration 94 ms    Q-T Interval 420 ms    QTC Calculation (Bezet) 415 ms    Calculated P Axis 30 degrees    Calculated R Axis 22 degrees    Calculated T Axis 74 degrees    Diagnosis       Sinus bradycardia  Otherwise normal ECG  When compared with ECG of 13-APR-2020 20:30,  No significant change was found     CBC WITH AUTOMATED DIFF    Collection Time: 03/28/21  5:19 PM   Result Value Ref Range    WBC 10.7 4.6 - 13.2 K/uL    RBC 4.56 4.20 - 5.30 M/uL    HGB 14.2 12.0 - 16.0 g/dL    HCT 42.4 35.0 - 45.0 %    MCV 93.0 74.0 - 97.0 FL    MCH 31.1 24.0 - 34.0 PG    MCHC 33.5 31.0 - 37.0 g/dL    RDW 12.6 11.6 - 14.5 %    PLATELET 561 780 - 759 K/uL    MPV 8.8 (L) 9.2 - 11.8 FL    NEUTROPHILS 50 40 - 73 %    LYMPHOCYTES 38 21 - 52 %    MONOCYTES 10 3 - 10 %    EOSINOPHILS 2 0 - 5 %    BASOPHILS 0 0 - 2 %    ABS. NEUTROPHILS 5.4 1.8 - 8.0 K/UL    ABS. LYMPHOCYTES 4.0 (H) 0.9 - 3.6 K/UL    ABS. MONOCYTES 1.0 0.05 - 1.2 K/UL    ABS. EOSINOPHILS 0.2 0.0 - 0.4 K/UL    ABS. BASOPHILS 0.0 0.0 - 0.1 K/UL    DF AUTOMATED     METABOLIC PANEL, COMPREHENSIVE    Collection Time: 03/28/21  5:19 PM   Result Value Ref Range    Sodium 141 136 - 145 mmol/L    Potassium 3.6 3.5 - 5.5 mmol/L    Chloride 107 100 - 111 mmol/L    CO2 26 21 - 32 mmol/L    Anion gap 8 3.0 - 18 mmol/L    Glucose 105 (H) 74 - 99 mg/dL    BUN 14 7.0 - 18 MG/DL    Creatinine 0.52 (L) 0.6 - 1.3 MG/DL    BUN/Creatinine ratio 27 (H) 12 - 20      GFR est AA >60 >60 ml/min/1.73m2    GFR est non-AA >60 >60 ml/min/1.73m2    Calcium 8.8 8.5 - 10.1 MG/DL    Bilirubin, total 0.3 0.2 - 1.0 MG/DL    ALT (SGPT) 38 13 - 56 U/L    AST (SGOT) 16 10 - 38 U/L    Alk.  phosphatase 121 (H) 45 - 117 U/L    Protein, total 7.3 6.4 - 8.2 g/dL    Albumin 3.5 3.4 - 5.0 g/dL    Globulin 3.8 2.0 - 4.0 g/dL    A-G Ratio 0.9 0.8 - 1.7     CARDIAC PANEL,(CK, CKMB & TROPONIN)    Collection Time: 03/28/21  5:19 PM   Result Value Ref Range    CK - MB <1.0 <3.6 ng/ml    CK-MB Index  0.0 - 4.0 %     CALCULATION NOT PERFORMED WHEN RESULT IS BELOW LINEAR LIMIT    CK 50 26 - 192 U/L    Troponin-I, QT <0.02 0.0 - 0.045 NG/ML      CXR: NAD     /62. Given labetalol 20 with improvement of BP to 161/60.     6:15PM Jacqui from CT informing the patient is having back spasming. Patient stated this occurred after getting the morphine. She was given Benadryl with improvement of this. Patient updated throughout visit. 7:35 PM informed her we are awaiting CTA read as well as repeat troponin at 8:30pm.  Pt currently completely asymptomatic.     7:41 PM CTA chest/abd/pelvis without acute process. Consult with Dr. Megan Mitchell who states she will accept the patient for admission. Requesting cardiology consult, which Dr. Antonieta Hill will do. Diagnosis:   1. Chest pain, unspecified type    2. Coronary artery disease involving native coronary artery of native heart without angina pectoris    3. Essential hypertension      Disposition: Admission    Follow-up Information     Follow up With Specialties Details Why Contact Info    Zunilda Muñiz MD Family Medicine On 4/1/2021 @ 2:15pm [address] [place] [address]      Rico Rothman MD Cardiology, Internal Medicine On 4/5/2021 @ 1:30pm [address] [place] [id number] [phone]            Discharge Medication List as of 3/29/2021  4:19 PM      CONTINUE these medications which have CHANGED    Details   lisinopriL (PRINIVIL, ZESTRIL) 20 mg tablet Take 1 Tab by mouth daily. , Print, Disp-30 Tab, R-0         CONTINUE these medications which have NOT CHANGED    Details   promethazine (PHENERGAN) 25 mg tablet Take 1 Tab by mouth every eight (8) hours as needed for Nausea. Indications: prevent nausea and vomiting after surgery, Normal, Disp-20 Tab, R-0      senna-docusate (PERICOLACE) 8.6-50 mg per tablet Take 1 Tab by mouth two (2) times a day. Indications: constipation, Normal, Disp-30 Tab, R-0      atorvastatin (LIPITOR) 20 mg tablet Take  by mouth daily. , Historical Med      aspirin delayed-release 81 mg tablet Take  by mouth daily. , Historical Med      METOPROLOL TARTRATE (LOPRESSOR PO) Take 50 mg by mouth two (2) times a day., Historical Med      escitalopram oxalate (LEXAPRO) 5 mg tablet Take 10 mg by mouth daily. , Historical Med           Harini Elliott, [place]

## 2021-03-29 ENCOUNTER — APPOINTMENT (OUTPATIENT)
Dept: NON INVASIVE DIAGNOSTICS | Age: 61
End: 2021-03-29
Attending: NURSE PRACTITIONER
Payer: MEDICARE

## 2021-03-29 VITALS
DIASTOLIC BLOOD PRESSURE: 71 MMHG | WEIGHT: 152 LBS | HEIGHT: 62 IN | RESPIRATION RATE: 18 BRPM | TEMPERATURE: 97.5 F | BODY MASS INDEX: 27.97 KG/M2 | SYSTOLIC BLOOD PRESSURE: 154 MMHG | OXYGEN SATURATION: 94 % | HEART RATE: 67 BPM

## 2021-03-29 LAB
ATRIAL RATE: 59 BPM
CALCULATED P AXIS, ECG09: 30 DEGREES
CALCULATED R AXIS, ECG10: 22 DEGREES
CALCULATED T AXIS, ECG11: 74 DEGREES
CHOLEST SERPL-MCNC: 133 MG/DL
DIAGNOSIS, 93000: NORMAL
ECHO AO ROOT DIAM: 2.76 CM
ECHO LA AREA 4C: 19.47 CM2
ECHO LA VOL 2C: 45.12 ML (ref 22–52)
ECHO LA VOL 4C: 50.81 ML (ref 22–52)
ECHO LA VOL BP: 51.96 ML (ref 22–52)
ECHO LA VOL/BSA BIPLANE: 30.55 ML/M2 (ref 16–28)
ECHO LA VOLUME INDEX A2C: 26.52 ML/M2 (ref 16–28)
ECHO LA VOLUME INDEX A4C: 29.87 ML/M2 (ref 16–28)
ECHO LV E' LATERAL VELOCITY: 8.53 CM/S
ECHO LV E' SEPTAL VELOCITY: 7.97 CM/S
ECHO LV INTERNAL DIMENSION DIASTOLIC: 4.47 CM (ref 3.9–5.3)
ECHO LV INTERNAL DIMENSION SYSTOLIC: 2.7 CM
ECHO LV IVSD: 0.94 CM (ref 0.6–0.9)
ECHO LV MASS 2D: 152.7 G (ref 67–162)
ECHO LV MASS INDEX 2D: 89.8 G/M2 (ref 43–95)
ECHO LV POSTERIOR WALL DIASTOLIC: 1.07 CM (ref 0.6–0.9)
ECHO LVOT CARDIAC OUTPUT: 4.51 LITER/MINUTE
ECHO LVOT DIAM: 1.91 CM
ECHO LVOT PEAK GRADIENT: 3.93 MMHG
ECHO LVOT PEAK VELOCITY: 99.17 CM/S
ECHO LVOT SV: 72.1 ML
ECHO LVOT VTI: 25.27 CM
ECHO MV A VELOCITY: 98.42 CM/S
ECHO MV E DECELERATION TIME (DT): 200.12 MS
ECHO MV E VELOCITY: 112.06 CM/S
ECHO MV E/A RATIO: 1.14
ECHO MV E/E' LATERAL: 13.14
ECHO MV E/E' RATIO (AVERAGED): 13.6
ECHO MV E/E' SEPTAL: 14.06
ECHO RV TAPSE: 3.33 CM (ref 1.5–2)
ECHO TV REGURGITANT MAX VELOCITY: 280.84 CM/S
ECHO TV REGURGITANT PEAK GRADIENT: 31.55 MMHG
HBA1C MFR BLD: 5.9 % (ref 4.2–5.6)
HDLC SERPL-MCNC: 44 MG/DL (ref 40–60)
HDLC SERPL: 3 {RATIO} (ref 0–5)
LDLC SERPL CALC-MCNC: 69.4 MG/DL (ref 0–100)
LIPID PROFILE,FLP: NORMAL
LVOT MG: 2.04 MMHG
P-R INTERVAL, ECG05: 142 MS
Q-T INTERVAL, ECG07: 420 MS
QRS DURATION, ECG06: 94 MS
QTC CALCULATION (BEZET), ECG08: 415 MS
STRESS BASELINE DIAS BP: 99 MMHG
STRESS BASELINE HR: 67 BPM
STRESS BASELINE SYS BP: 179 MMHG
STRESS ESTIMATED WORKLOAD: 1 METS
STRESS EXERCISE DUR MIN: NORMAL
STRESS PEAK DIAS BP: 99 MMHG
STRESS PEAK SYS BP: 179 MMHG
STRESS PERCENT HR ACHIEVED: 63 %
STRESS POST PEAK HR: 100 BPM
STRESS RATE PRESSURE PRODUCT: NORMAL BPM*MMHG
STRESS TARGET HR: 158 BPM
TRIGL SERPL-MCNC: 98 MG/DL (ref ?–150)
VENTRICULAR RATE, ECG03: 59 BPM
VLDLC SERPL CALC-MCNC: 19.6 MG/DL

## 2021-03-29 PROCEDURE — 83036 HEMOGLOBIN GLYCOSYLATED A1C: CPT

## 2021-03-29 PROCEDURE — 78452 HT MUSCLE IMAGE SPECT MULT: CPT | Performed by: INTERNAL MEDICINE

## 2021-03-29 PROCEDURE — 99220 PR INITIAL OBSERVATION CARE/DAY 70 MINUTES: CPT | Performed by: INTERNAL MEDICINE

## 2021-03-29 PROCEDURE — 99222 1ST HOSP IP/OBS MODERATE 55: CPT | Performed by: INTERNAL MEDICINE

## 2021-03-29 PROCEDURE — 36415 COLL VENOUS BLD VENIPUNCTURE: CPT

## 2021-03-29 PROCEDURE — 96376 TX/PRO/DX INJ SAME DRUG ADON: CPT

## 2021-03-29 PROCEDURE — 74011250637 HC RX REV CODE- 250/637: Performed by: NURSE PRACTITIONER

## 2021-03-29 PROCEDURE — 93306 TTE W/DOPPLER COMPLETE: CPT

## 2021-03-29 PROCEDURE — 74011250636 HC RX REV CODE- 250/636: Performed by: INTERNAL MEDICINE

## 2021-03-29 PROCEDURE — 93018 CV STRESS TEST I&R ONLY: CPT | Performed by: INTERNAL MEDICINE

## 2021-03-29 PROCEDURE — 99239 HOSP IP/OBS DSCHRG MGMT >30: CPT | Performed by: HOSPITALIST

## 2021-03-29 PROCEDURE — 74011250637 HC RX REV CODE- 250/637: Performed by: INTERNAL MEDICINE

## 2021-03-29 PROCEDURE — 80061 LIPID PANEL: CPT

## 2021-03-29 PROCEDURE — 93306 TTE W/DOPPLER COMPLETE: CPT | Performed by: INTERNAL MEDICINE

## 2021-03-29 PROCEDURE — 2709999900 HC NON-CHARGEABLE SUPPLY

## 2021-03-29 PROCEDURE — 96372 THER/PROPH/DIAG INJ SC/IM: CPT

## 2021-03-29 PROCEDURE — 93016 CV STRESS TEST SUPVJ ONLY: CPT | Performed by: INTERNAL MEDICINE

## 2021-03-29 PROCEDURE — 99218 HC RM OBSERVATION: CPT

## 2021-03-29 PROCEDURE — 74011250636 HC RX REV CODE- 250/636: Performed by: NURSE PRACTITIONER

## 2021-03-29 RX ORDER — TRAMADOL HYDROCHLORIDE 50 MG/1
50 TABLET ORAL
Status: DISCONTINUED | OUTPATIENT
Start: 2021-03-29 | End: 2021-03-29 | Stop reason: HOSPADM

## 2021-03-29 RX ORDER — ESCITALOPRAM OXALATE 10 MG/1
10 TABLET ORAL DAILY
Status: DISCONTINUED | OUTPATIENT
Start: 2021-03-29 | End: 2021-03-29 | Stop reason: HOSPADM

## 2021-03-29 RX ORDER — SODIUM CHLORIDE 9 MG/ML
250 INJECTION, SOLUTION INTRAVENOUS ONCE
Status: COMPLETED | OUTPATIENT
Start: 2021-03-29 | End: 2021-03-29

## 2021-03-29 RX ORDER — PROMETHAZINE HYDROCHLORIDE 12.5 MG/1
12.5 TABLET ORAL
Status: DISCONTINUED | OUTPATIENT
Start: 2021-03-29 | End: 2021-03-29 | Stop reason: HOSPADM

## 2021-03-29 RX ORDER — POLYETHYLENE GLYCOL 3350 17 G/17G
17 POWDER, FOR SOLUTION ORAL DAILY PRN
Status: DISCONTINUED | OUTPATIENT
Start: 2021-03-29 | End: 2021-03-29 | Stop reason: HOSPADM

## 2021-03-29 RX ORDER — METOPROLOL TARTRATE 50 MG/1
50 TABLET ORAL 2 TIMES DAILY
Status: DISCONTINUED | OUTPATIENT
Start: 2021-03-29 | End: 2021-03-29

## 2021-03-29 RX ORDER — ONDANSETRON 2 MG/ML
4 INJECTION INTRAMUSCULAR; INTRAVENOUS
Status: DISCONTINUED | OUTPATIENT
Start: 2021-03-29 | End: 2021-03-29 | Stop reason: HOSPADM

## 2021-03-29 RX ORDER — LISINOPRIL 20 MG/1
20 TABLET ORAL DAILY
Status: DISCONTINUED | OUTPATIENT
Start: 2021-03-30 | End: 2021-03-29 | Stop reason: HOSPADM

## 2021-03-29 RX ORDER — LISINOPRIL 20 MG/1
20 TABLET ORAL DAILY
Qty: 30 TAB | Refills: 0 | Status: SHIPPED | OUTPATIENT
Start: 2021-03-30

## 2021-03-29 RX ORDER — METOPROLOL TARTRATE 25 MG/1
25 TABLET, FILM COATED ORAL 2 TIMES DAILY
Status: DISCONTINUED | OUTPATIENT
Start: 2021-03-29 | End: 2021-03-29 | Stop reason: HOSPADM

## 2021-03-29 RX ORDER — SODIUM CHLORIDE 0.9 % (FLUSH) 0.9 %
5-40 SYRINGE (ML) INJECTION EVERY 8 HOURS
Status: DISCONTINUED | OUTPATIENT
Start: 2021-03-29 | End: 2021-03-29 | Stop reason: HOSPADM

## 2021-03-29 RX ORDER — ACETAMINOPHEN 325 MG/1
650 TABLET ORAL
Status: DISCONTINUED | OUTPATIENT
Start: 2021-03-29 | End: 2021-03-29 | Stop reason: HOSPADM

## 2021-03-29 RX ORDER — METOPROLOL TARTRATE 25 MG/1
25 TABLET, FILM COATED ORAL 2 TIMES DAILY
Qty: 60 TAB | Refills: 0 | Status: SHIPPED | OUTPATIENT
Start: 2021-03-29 | End: 2021-03-29

## 2021-03-29 RX ORDER — ASPIRIN 81 MG/1
81 TABLET ORAL DAILY
Status: DISCONTINUED | OUTPATIENT
Start: 2021-03-29 | End: 2021-03-29 | Stop reason: HOSPADM

## 2021-03-29 RX ORDER — LISINOPRIL 10 MG/1
10 TABLET ORAL DAILY
Status: DISCONTINUED | OUTPATIENT
Start: 2021-03-29 | End: 2021-03-29

## 2021-03-29 RX ORDER — ATORVASTATIN CALCIUM 20 MG/1
20 TABLET, FILM COATED ORAL DAILY
Status: DISCONTINUED | OUTPATIENT
Start: 2021-03-29 | End: 2021-03-29 | Stop reason: HOSPADM

## 2021-03-29 RX ORDER — LISINOPRIL 10 MG/1
10 TABLET ORAL ONCE
Status: COMPLETED | OUTPATIENT
Start: 2021-03-29 | End: 2021-03-29

## 2021-03-29 RX ORDER — SODIUM CHLORIDE 0.9 % (FLUSH) 0.9 %
5-40 SYRINGE (ML) INJECTION AS NEEDED
Status: DISCONTINUED | OUTPATIENT
Start: 2021-03-29 | End: 2021-03-29 | Stop reason: HOSPADM

## 2021-03-29 RX ORDER — ACETAMINOPHEN 650 MG/1
650 SUPPOSITORY RECTAL
Status: DISCONTINUED | OUTPATIENT
Start: 2021-03-29 | End: 2021-03-29 | Stop reason: HOSPADM

## 2021-03-29 RX ORDER — HYDRALAZINE HYDROCHLORIDE 20 MG/ML
10 INJECTION INTRAMUSCULAR; INTRAVENOUS
Status: DISCONTINUED | OUTPATIENT
Start: 2021-03-29 | End: 2021-03-29 | Stop reason: HOSPADM

## 2021-03-29 RX ORDER — FAMOTIDINE 20 MG/1
20 TABLET, FILM COATED ORAL 2 TIMES DAILY
Status: DISCONTINUED | OUTPATIENT
Start: 2021-03-29 | End: 2021-03-29 | Stop reason: HOSPADM

## 2021-03-29 RX ORDER — KETOROLAC TROMETHAMINE 15 MG/ML
15 INJECTION, SOLUTION INTRAMUSCULAR; INTRAVENOUS
Status: DISCONTINUED | OUTPATIENT
Start: 2021-03-29 | End: 2021-03-29 | Stop reason: HOSPADM

## 2021-03-29 RX ADMIN — Medication 10 ML: at 14:00

## 2021-03-29 RX ADMIN — REGADENOSON 0.4 MG: 0.08 INJECTION, SOLUTION INTRAVENOUS at 12:55

## 2021-03-29 RX ADMIN — TRAMADOL HYDROCHLORIDE 50 MG: 50 TABLET, COATED ORAL at 09:08

## 2021-03-29 RX ADMIN — LISINOPRIL 10 MG: 10 TABLET ORAL at 17:18

## 2021-03-29 RX ADMIN — HEPARIN SODIUM 5000 UNITS: 5000 INJECTION INTRAVENOUS; SUBCUTANEOUS at 09:01

## 2021-03-29 RX ADMIN — ATORVASTATIN CALCIUM 20 MG: 20 TABLET, FILM COATED ORAL at 09:00

## 2021-03-29 RX ADMIN — FAMOTIDINE 20 MG: 20 TABLET ORAL at 09:00

## 2021-03-29 RX ADMIN — FAMOTIDINE 20 MG: 20 TABLET ORAL at 17:18

## 2021-03-29 RX ADMIN — Medication 81 MG: at 09:00

## 2021-03-29 RX ADMIN — ESCITALOPRAM OXALATE 10 MG: 10 TABLET ORAL at 09:00

## 2021-03-29 RX ADMIN — LISINOPRIL 10 MG: 10 TABLET ORAL at 09:00

## 2021-03-29 RX ADMIN — HYDRALAZINE HYDROCHLORIDE 10 MG: 20 INJECTION INTRAMUSCULAR; INTRAVENOUS at 11:26

## 2021-03-29 RX ADMIN — Medication 10 ML: at 09:08

## 2021-03-29 RX ADMIN — SODIUM CHLORIDE 250 ML: 900 INJECTION, SOLUTION INTRAVENOUS at 12:55

## 2021-03-29 NOTE — ED NOTES
8:29 PM  D/w cardiology, dr Earl Cantrell, agrees w plan, requests 5000 heparin sub q bid, and put dr Nelson Dhaliwal on tx team

## 2021-03-29 NOTE — DISCHARGE INSTRUCTIONS
DISCHARGE SUMMARY from Nurse    PATIENT INSTRUCTIONS:    After general anesthesia or intravenous sedation, for 24 hours or while taking prescription Narcotics:  · Limit your activities  · Do not drive and operate hazardous machinery  · Do not make important personal or business decisions  · Do  not drink alcoholic beverages  · If you have not urinated within 8 hours after discharge, please contact your surgeon on call. Report the following to your surgeon:  · Excessive pain, swelling, redness or odor of or around the surgical area  · Temperature over 100.5  · Nausea and vomiting lasting longer than 4 hours or if unable to take medications  · Any signs of decreased circulation or nerve impairment to extremity: change in color, persistent  numbness, tingling, coldness or increase pain  · Any questions    What to do at Home:  Recommended activity: Activity as tolerated    If you experience any of the following symptoms chest pain, shortness of breath, nausea/vomiting, or fever, please follow up with Dr. Marianne Patel. *  Please give a list of your current medications to your Primary Care Provider. *  Please update this list whenever your medications are discontinued, doses are      changed, or new medications (including over-the-counter products) are added. *  Please carry medication information at all times in case of emergency situations. These are general instructions for a healthy lifestyle:    No smoking/ No tobacco products/ Avoid exposure to second hand smoke  Surgeon General's Warning:  Quitting smoking now greatly reduces serious risk to your health.     Obesity, smoking, and sedentary lifestyle greatly increases your risk for illness    A healthy diet, regular physical exercise & weight monitoring are important for maintaining a healthy lifestyle    You may be retaining fluid if you have a history of heart failure or if you experience any of the following symptoms:  Weight gain of 3 pounds or more overnight or 5 pounds in a week, increased swelling in our hands or feet or shortness of breath while lying flat in bed. Please call your doctor as soon as you notice any of these symptoms; do not wait until your next office visit. Patient armband removed and shredded      The discharge information has been reviewed with the patient. The patient verbalized understanding. Discharge medications reviewed with the patient and appropriate educational materials and side effects teaching were provided.   ___________________________________________________________________________________________________________________________________

## 2021-03-29 NOTE — H&P
Date of Admission: 3/28/2021      Assessment:   Atypical chest pain, suspect musculoskeletal etiology over cardiac: EKG without ST T wave elevation or depression. No significant change from prior EKGs. Troponin negative x2. CTA and chest x-ray currently pending  Hypertensive emergency  Diet controlled diabetes mellitus type 2  History of coronary artery disease with stent x1 in 2017 at [facility]  History of cervical spine stenosis with surgical intervention  Reported history of anemia: Currently hemoglobin within normal limits  Plan:   Follow-up CTA of chest  Follow-up chest x-ray  Cardiology has been consulted and Dr. Luis Pierce  will see the patient this morning  Continue home medications including aspirin, Lipitor, beta-blocker, lisinopril. Will give Toradol in place of morphine  Patient is currently n.p.o. in the event cardiology deems she needs a stress test  CBC and BMP in a.m. Hb A1c, lipid panel    Garnett Schlatter D.O. Internal Medicine and Infectious Diseases      Subjective:    Patient is a 56 y. o.female who is being evaluated for chest pain. Ms. Davon Qiu is a pleasant 57-year-old female with a past medical history significant for chronic anemia, hypertension, coronary artery disease with prior stenting in 2017, diet-controlled diabetes mellitus type 2, dyslipidemia, and cervical spine spine stenosis who is presenting to the emergency department at [facility] with complaints of left-sided chest pain. He notes that her pain had started about 3 days prior to her presentation. Predominantly on the left sternal border however it moves throughout her chest into her back shoulder and fingers. She states that it generally is a dull achy pain but she started to have a stabbing pain along with sternal border when she moves or twists in a particular way. She was given morphine in the ER at [facility] which relieved her symptoms.   At the time of my visit symptoms were starting to return as the morphine is started to wear off. She denies any associated shortness of breath, lower extremity edema jaw pain. She denies any palpitations. She has a mild headache as well. No fevers or chills. She states that her prior chest pain had been slightly similar to this although had not lasted quite as long. Initial evaluation in the [facility] emergency department revealed troponins which were negative x2 sets and EKG without ST elevation or depression. She does have a sinus bradycardia. CTA as well as chest x-ray have been ordered and are currently pending.         Past Medical History:   Diagnosis Date    Anxiety     Arthritis     CAD (coronary artery disease)     ONE VESSEL STENT 3/2017 [facility] Cervical stenosis of spine     Coronary artery disease involving native coronary artery of native heart 2017    Deficiency anemia     Depression     Diabetes ([facility] [address]     Diet controlled (per pt.)    Esophageal reflux     FOOD RELATED HEART BURN     Exercise tolerance finding 07/2018    WALKS 20 MINUTES 2 DAYS, HOUSE AND YARD WORK, CAN CLIMBS STAIRS AND LAY FLAT-- DENIES SOB OR CHEST PAINS    Fibromyalgia     GERD (gastroesophageal reflux disease)     Gross hematuria     HLD (hyperlipidemia)     Hypercholesteremia     Hypertension     Kidney stone     Mitral valve prolapse     NO PROBLEMS     PONV (postoperative nausea and vomiting)     Right kidney stone     S/P angioplasty with stent 2017    Cath 2/7/17, [facility]. Ronal [facility], [facility]    Smoker     Wears glasses      Past Surgical History:   Procedure Laterality Date    HX CHOLECYSTECTOMY      HX CORONARY STENT PLACEMENT  03/2017    ONE VESSEL STENT    HX OTHER SURGICAL      SPINE LO cervical spine    HX REFRACTIVE SURGERY Bilateral     HX SHOULDER ARTHROSCOPY Right 07/2018    HX TUBAL LIGATION Bilateral     HX UROLOGICAL  05/10/2019    S/p cystoscopy, exchange of JJ stent, right URS, laser lithotripsy, and removal of stone by  [facility] at [facility]     History reviewed. No pertinent family history.   Medications reviewed as below:   Current Facility-Administered Medications   Medication Dose Route Frequency Provider Last Rate Last Admin    aspirin delayed-release tablet 81 mg  81 mg Oral DAILY Bella Elam MD        atorvastatin (LIPITOR) tablet 20 mg  20 mg Oral DAILY Bella Elam MD        escitalopram oxalate (LEXAPRO) tablet 10 mg  10 mg Oral DAILY Bella Elam MD        lisinopriL (PRINIVIL, ZESTRIL) tablet 10 mg  10 mg Oral DAILY Bella Elam MD        metoprolol tartrate (LOPRESSOR) tablet 50 mg  50 mg Oral BID Bella Elam MD        sodium chloride (NS) flush 5-40 mL  5-40 mL IntraVENous Q8H Bella Elam MD        sodium chloride (NS) flush 5-40 mL  5-40 mL IntraVENous PRN Bella Elam MD        acetaminophen (TYLENOL) tablet 650 mg  650 mg Oral Q6H PRN Bella Elam MD        Or   Raya Walden acetaminophen (TYLENOL) suppository 650 mg  650 mg Rectal Q6H PRN Bella Elam MD        polyethylene glycol (MIRALAX) packet 17 g  17 g Oral DAILY PRN Bella Elam MD        promethazine (PHENERGAN) tablet 12.5 mg  12.5 mg Oral Q6H PRN Bella Elam MD        Or    [facility]etron [facility]) injection 4 mg  4 mg IntraVENous Q6H PRN Bella Elam MD        famotidine (PEPCID) tablet 20 mg  20 mg Oral BID Bella Elam MD        ketorolac (TORADOL) injection 15 mg  15 mg IntraVENous Q6H PRN Bella Elam MD        traMADoL [facility]) tablet 50 mg  50 mg Oral Q6H PRN Bella Elam MD        heparin (porcine) injection 5,000 Units  5,000 Units SubCUTAneous Q12H Bella Elam MD   5,000 Units at 03/28/21 2102     Allergies   Allergen Reactions    Oxycodone-Acetaminophen Other (comments) and Itching     Social History     Socioeconomic History    Marital status:      Spouse name: Not on file    Number of children: Not on file    Years of education: Not on file    Highest education level: Not on file   Occupational History    Not on file   Social Needs    Financial resource strain: Not on file    Food insecurity     Worry: Not on file     Inability: Not on file    Transportation needs     Medical: Not on file     Non-medical: Not on file   Tobacco Use    Smoking status: Current Every Day Smoker     Packs/day: 1.00     Years: 30.00     Pack years: 30.00     Types: Cigarettes    Smokeless tobacco: Never Used   Substance and Sexual Activity    Alcohol use: No    Drug use: No    Sexual activity: Not on file   Lifestyle    Physical activity     Days per week: Not on file     Minutes per session: Not on file    Stress: Not on file   Relationships    Social connections     Talks on phone: Not on file     Gets together: Not on file     Attends Rastafarian service: Not on file     Active member of club or organization: Not on file     Attends meetings of clubs or organizations: Not on file     Relationship status: Not on file    Intimate partner violence     Fear of current or ex partner: Not on file     Emotionally abused: Not on file     Physically abused: Not on file     Forced sexual activity: Not on file   Other Topics Concern    Not on file   Social History Narrative    Not on file        Review of Systems    Negative Unless BOLDED    General: fevers, chills, myalgias, arthralgias, unexplained weight loss, malaise, fatigue. HEENT:  headaches,sinus pain or presure, recent URI, recent dental procedures;  tinnitus, hearing loss , visual changes, catarats, dizziness or blurred vision  PUlMONARY:  cough , shortness of breath, sputum production, hx of asthma or COPD. previous treatement for TB or PPD. Cardiovascular: chest pain, previous CAD/MI, vavlular heart disease,  murmurs  GI:   nausea, vomiting, diarrhea, abdominal pain, prior C.diff  :  urinary frequency, dysuria, hematuria, bladder incontinence.    Neurologic:  seizures, syncope or prior CVA/TIA, confusion, memory impairment, neuropathy  Musculoskeletal: myalgias arthralgias, joint pain/ swelling,  back pain  Skin:  Purities,  recurrent cellulitis,  chronic stasis ulcer, diabetic foot ulcers  Endocrine: polyuria, polydipsia, hair loss, weight gain  Psych: Denies depression or treatment by a psychiatrist/psycologist  Heme-Onc: prior DVT, easy bruising, fatigue, malignancy        Objective:        Visit Vitals  BP (!) 170/79 (BP 1 Location: Left upper arm, BP Patient Position: At rest)   Pulse (!) 56   Temp 97.9 °F (36.6 °C)   Resp 17   Ht 5' 2\" (1.575 m)   Wt 68.9 kg (152 lb)   SpO2 91%   Breastfeeding No   BMI 27.80 kg/m²     Temp (24hrs), Av.6 °F (36.4 °C), Min:97 °F (36.1 °C), Max:97.9 °F (36.6 °C)        General:   awake alert and oriented   Skin:   no rashes or skin lesions noted on limited exam   HEENT:  Normocephalic, atraumatic, PERRL, EOMI, no scleral icterus or pallor; no conjunctival hemmohage;  nasal and oral mucous are moist and without evidence of lesions. No thrush. Dentition good. Neck supple, no bruits. Lymph Nodes:   no cervical, axillary or inguinal adenopathy   Lungs:   non-labored, bilaterally clear to aspiration- no crackles wheezes rales or rhonchi   Heart:  RRR, s1 and s2; no murmurs rubs or gallops, no edema, + pedal pulses, reproducible pain with deep palpation along the left sternal border around ribs 3 and 4 slight increase in warmth on left compared to right. Abdomen:  soft, non-distended, active bowel sounds, no hepatomegaly, no splenomegaly. Non-tender   Genitourinary:  deferred   Extremities:   no clubbing, cyanosis; no joint effusions or swelling; Full ROM of all large joints to the upper and lower extremities; muscle mass appropriate for age   Neurologic:  No gross focal sensory abnormalities; 5/5 muscle strength to upper and lower extremities. Speech appropirate. Cranial nerves intact   Psychiatric:   appropriate and interactive.        Labs: Results:   Chemistry Recent Labs     21  1719   *      K 3.6   CL 107   CO2 26   BUN 14   CREA 0.52*   CA 8.8   AGAP 8   BUCR 27*   *   TP 7.3   ALB 3.5   GLOB 3.8   AGRAT 0.9      CBC w/Diff Recent Labs     03/28/21  1719   WBC 10.7   RBC 4.56   HGB 14.2   HCT 42.4      GRANS 50   LYMPH 38   EOS 2            No results found for: SDES Lab Results   Component Value Date/Time    Culture result: NO GROWTH 1 DAY 02/05/2015 10:37 AM          Imaging:      All imaging reviewed from Admission to present as per radiology interpretation in [address] [place] [address]

## 2021-03-29 NOTE — PROGRESS NOTES
Echocardiogram completed. Patient returned to room with armband in place. Report to follow.     [address] [facility]

## 2021-03-29 NOTE — CONSULTS
[facility] - Consult Note    Date of  Admission: 3/28/2021  4:49 PM     Primary Care Physician:  Chandler Liang MD     Plan:         1. Chest pain atypical- possible musculoskeletal vs CAD- troponin negative x2 . ekg shows sinus bradycardia w/o acute ischemic changes. Will proceed with NST to assess CAD progression. Check echo to assess LVFand significant vhd     2. CAD- s/p PCI in 2017 - continue asa,statin. reduced BB due to bradycardia   3. Hypertension- uncontrolled- increased  ACEi. Added low dose Norvasc. Need better BP control. 4. Tobacco abuse- discussed cessation. 5. Diabetes- per records A1C 5.9   6. Anemia- H&H stable monitor   7. Depression- medications per medical team     Patient is a 68-year-old female with history of CAD status post PCI in 2017 admitted with atypical chest pain. Serial cardiac enzymes are negative. EKG-no acute ischemic changes. Underwent echo and nuclear stress test were negative for ischemia. Has tenderness to palpation. No significant heart murmurs noted. Continue intense risk factor modification. We will follow-up as outpatient. XR Results (most recent):  Results from Hospital Encounter encounter on 03/28/21   XR CHEST PORT    Narrative EXAM:  XR CHEST PORT    INDICATION:   Chest pain    COMPARISON: 2/5/2015. FINDINGS:  The cardiac and mediastinal silhouette are within normal limits. Pulmonary  vasculature is within normal limits. No pneumothorax or pleural effusions. No  air space opacity. No acute osseous abnormality. Impression No radiographic evidence of acute cardiopulmonary process. Assessment:     Hospital Problems  Date Reviewed: 4/15/2020          Codes Class Noted POA    Chest pain ICD-10-CM: R07.9  ICD-9-CM: 786.50  3/28/2021 Unknown        Hypertensive urgency ICD-10-CM: I16.0  ICD-9-CM: 401.9  3/28/2021 Unknown                   History of Present Illness:          This is a 56 y.o. female admitted for Hypertensive urgency [I16.0] Chest pain [R07.9]. Patient with PMHx of  chronic anemia, hypertension, coronary artery disease with prior stenting in 2017, diet-controlled diabetes mellitus type 2, dyslipidemia, and cervical spine spine stenosis who is presenting to the emergency department at [facility] with complaints of left-sided chest pain. He notes that her pain had started about 3 days prior to her presentation. Predominantly on the left sternal border however it moves throughout her chest into her back shoulder and fingers. She states that it generally is a dull achy pain but she started to have a stabbing pain along with sternal border when she moves or twists in a particular way. She was given morphine in the ER at [facility] which relieved her symptoms. At the time of my visit symptoms were starting to return as the morphine is started to wear off. She denies any associated shortness of breath, lower extremity edema jaw pain. She denies any palpitations. She has a mild headache as well. No fevers or chills. Initial evaluation in the [facility] emergency department revealed troponins which were negative x2 sets and EKG without ST elevation or depression. She does have a sinus bradycardia.          Past Medical History:     Past Medical History:   Diagnosis Date    Anxiety     Arthritis     CAD (coronary artery disease)     ONE VESSEL STENT 3/2017 [facility] Cervical stenosis of spine     Coronary artery disease involving native coronary artery of native heart 2017    Deficiency anemia     Depression     Diabetes ([facility] [address]     Diet controlled (per pt.)    Esophageal reflux     FOOD RELATED HEART BURN     Exercise tolerance finding 07/2018    WALKS 20 MINUTES 2 DAYS, HOUSE AND YARD WORK, CAN CLIMBS STAIRS AND LAY FLAT-- DENIES SOB OR CHEST PAINS    Fibromyalgia     GERD (gastroesophageal reflux disease)     Gross hematuria     HLD (hyperlipidemia)     Hypercholesteremia     Hypertension  Kidney stone     Mitral valve prolapse     NO PROBLEMS     PONV (postoperative nausea and vomiting)     Right kidney stone     S/P angioplasty with stent 2017    Cath [facility]17[facility]    Smoker     Wears glasses          Social History:     Social History     Socioeconomic History    Marital status:      Spouse name: Not on file    Number of children: Not on file    Years of education: Not on file    Highest education level: Not on file   Tobacco Use    Smoking status: Current Every Day Smoker     Packs/day: 1.00     Years: 30.00     Pack years: 30.00     Types: Cigarettes    Smokeless tobacco: Never Used   Substance and Sexual Activity    Alcohol use: No    Drug use: No        Family History:   History reviewed. No pertinent family history. Medications:      Allergies   Allergen Reactions    Oxycodone-Acetaminophen Other (comments) and Itching        Current Facility-Administered Medications   Medication Dose Route Frequency    aspirin delayed-release tablet 81 mg  81 mg Oral DAILY    atorvastatin (LIPITOR) tablet 20 mg  20 mg Oral DAILY    escitalopram oxalate (LEXAPRO) tablet 10 mg  10 mg Oral DAILY    lisinopriL (PRINIVIL, ZESTRIL) tablet 10 mg  10 mg Oral DAILY    metoprolol tartrate (LOPRESSOR) tablet 50 mg  50 mg Oral BID    sodium chloride (NS) flush 5-40 mL  5-40 mL IntraVENous Q8H    sodium chloride (NS) flush 5-40 mL  5-40 mL IntraVENous PRN    acetaminophen (TYLENOL) tablet 650 mg  650 mg Oral Q6H PRN    Or    acetaminophen (TYLENOL) suppository 650 mg  650 mg Rectal Q6H PRN    polyethylene glycol (MIRALAX) packet 17 g  17 g Oral DAILY PRN    promethazine (PHENERGAN) tablet 12.5 mg  12.5 mg Oral Q6H PRN    Or    ondansetron (ZOFRAN) injection 4 mg  4 mg IntraVENous Q6H PRN    famotidine (PEPCID) tablet 20 mg  20 mg Oral BID    ketorolac (TORADOL) injection 15 mg  15 mg IntraVENous Q6H PRN    traMADoL (ULTRAM) tablet 50 mg  50 mg Oral Q6H PRN    hydrALAZINE (APRESOLINE) 20 mg/mL injection 10 mg  10 mg IntraVENous Q6H PRN    heparin (porcine) injection 5,000 Units  5,000 Units SubCUTAneous Q12H        Review Of Systems:           Constitutional: No fever, no chills, no weight loss, no night sweats   HEENT: No epistaxis, no nasal drainage, no difficulty in swallowing, no redness in eyes  Respiratory: negative for cough, sputum, hemoptysis, pleurisy/chest pain, wheezing, dyspnea on exertion or emphysema  Cardiovascular: atypical chest pain chest pressure. Gastrointestinal: no abd pain, no vomiting, no diarrhea, no bleeding symptoms  Genitourinary: No urinary symptoms or hematuria  Integument/breast: No ulcers or rashes  Musculoskeletal: back pain  Neurological: No focal weakness, no seizures, no headaches  Behvioral/Psych: No anxiety, no depression             Physical Exam:     Visit Vitals  BP (!) 181/79 (BP 1 Location: Left upper arm, BP Patient Position: At rest)   Pulse (!) 55   Temp 97.8 °F (36.6 °C)   Resp 18   Ht 5' 2\" (1.575 m)   Wt 68.9 kg (152 lb)   SpO2 95%   Breastfeeding No   BMI 27.80 kg/m²     BP Readings from Last 3 Encounters:   03/29/21 (!) 181/79   06/22/20 151/83   06/08/20 155/76     Pulse Readings from Last 3 Encounters:   03/29/21 (!) 55   06/22/20 (!) 55   06/08/20 (!) 58     Wt Readings from Last 3 Encounters:   03/28/21 68.9 kg (152 lb)   06/22/20 67.1 kg (148 lb)   06/08/20 67.6 kg (149 lb)       General:  alert, cooperative, no distress, appears stated age  Skin: Warm and dry, acyanotic, normal color. Head: Normocephalic, atraumatic. Eyes: Sclerae anicteric, conjunctivae without injection.   Neck:  nontender, no nuchal rigidity, no masses, no stridor, no carotid bruit, no JVD  Lungs:  clear to auscultation bilaterall  Heart:  regular rate and rhythm, S1, S2 normal, no murmur, click, rub or gallop  Abdomen:  abdomen is soft without significant tenderness, masses, organomegaly or guarding  Extremities:  extremities normal, atraumatic, no cyanosis or edema. Peripheral pulses present   Neurological: grossly intact. No focal abnormalities, moves all extremities well. Psychiatric Affect: The patient is awake, alert and oriented x3. Olevia Call is interactive and appropriate. Data Review:     Recent Results (from the past 48 hour(s))   EKG, 12 LEAD, INITIAL    Collection Time: 03/28/21  4:55 PM   Result Value Ref Range    Ventricular Rate 59 BPM    Atrial Rate 59 BPM    P-R Interval 142 ms    QRS Duration 94 ms    Q-T Interval 420 ms    QTC Calculation (Bezet) 415 ms    Calculated P Axis 30 degrees    Calculated R Axis 22 degrees    Calculated T Axis 74 degrees    Diagnosis       Sinus bradycardia  Otherwise normal ECG  When compared with ECG of 13-APR-2020 20:30,  No significant change was found     CBC WITH AUTOMATED DIFF    Collection Time: 03/28/21  5:19 PM   Result Value Ref Range    WBC 10.7 4.6 - 13.2 K/uL    RBC 4.56 4.20 - 5.30 M/uL    HGB 14.2 12.0 - 16.0 g/dL    HCT 42.4 35.0 - 45.0 %    MCV 93.0 74.0 - 97.0 FL    MCH 31.1 24.0 - 34.0 PG    MCHC 33.5 31.0 - 37.0 g/dL    RDW 12.6 11.6 - 14.5 %    PLATELET 699 014 - 994 K/uL    MPV 8.8 (L) 9.2 - 11.8 FL    NEUTROPHILS 50 40 - 73 %    LYMPHOCYTES 38 21 - 52 %    MONOCYTES 10 3 - 10 %    EOSINOPHILS 2 0 - 5 %    BASOPHILS 0 0 - 2 %    ABS. NEUTROPHILS 5.4 1.8 - 8.0 K/UL    ABS. LYMPHOCYTES 4.0 (H) 0.9 - 3.6 K/UL    ABS. MONOCYTES 1.0 0.05 - 1.2 K/UL    ABS. EOSINOPHILS 0.2 0.0 - 0.4 K/UL    ABS.  BASOPHILS 0.0 0.0 - 0.1 K/UL    DF AUTOMATED     METABOLIC PANEL, COMPREHENSIVE    Collection Time: 03/28/21  5:19 PM   Result Value Ref Range    Sodium 141 136 - 145 mmol/L    Potassium 3.6 3.5 - 5.5 mmol/L    Chloride 107 100 - 111 mmol/L    CO2 26 21 - 32 mmol/L    Anion gap 8 3.0 - 18 mmol/L    Glucose 105 (H) 74 - 99 mg/dL    BUN 14 7.0 - 18 MG/DL    Creatinine 0.52 (L) 0.6 - 1.3 MG/DL    BUN/Creatinine ratio 27 (H) 12 - 20      GFR est AA >60 >60 ml/min/1.73m2    GFR est non-AA >60 >60 ml/min/1.73m2    Calcium 8.8 8.5 - 10.1 MG/DL    Bilirubin, total 0.3 0.2 - 1.0 MG/DL    ALT (SGPT) 38 13 - 56 U/L    AST (SGOT) 16 10 - 38 U/L    Alk.  phosphatase 121 (H) 45 - 117 U/L    Protein, total 7.3 6.4 - 8.2 g/dL    Albumin 3.5 3.4 - 5.0 g/dL    Globulin 3.8 2.0 - 4.0 g/dL    A-G Ratio 0.9 0.8 - 1.7     CARDIAC PANEL,(CK, CKMB & TROPONIN)    Collection Time: 03/28/21  5:19 PM   Result Value Ref Range    CK - MB <1.0 <3.6 ng/ml    CK-MB Index  0.0 - 4.0 %     CALCULATION NOT PERFORMED WHEN RESULT IS BELOW LINEAR LIMIT    CK 50 26 - 192 U/L    Troponin-I, QT <0.02 0.0 - 0.045 NG/ML   TROPONIN I    Collection Time: 03/28/21  8:48 PM   Result Value Ref Range    Troponin-I, QT <0.02 0.0 - 0.045 NG/ML   LIPID PANEL    Collection Time: 03/29/21  9:00 AM   Result Value Ref Range    LIPID PROFILE          Cholesterol, total 133 <200 MG/DL    Triglyceride 98 <150 MG/DL    HDL Cholesterol 44 40 - 60 MG/DL    LDL, calculated 69.4 0 - 100 MG/DL    VLDL, calculated 19.6 MG/DL    CHOL/HDL Ratio 3.0 0 - 5.0     HEMOGLOBIN A1C W/O EAG    Collection Time: 03/29/21  9:00 AM   Result Value Ref Range    Hemoglobin A1c 5.9 (H) 4.2 - 5.6 %   NUCLEAR CARDIAC STRESS TEST    Collection Time: 03/29/21 11:16 AM   Result Value Ref Range    Target  bpm       No intake or output data in the 24 hours ending 03/29/21 1118    Cardiographics:       EKG Results     Procedure 720 Value Units Date/Time    EKG, 12 LEAD, INITIAL [[id number]] Collected: 03/28/21 1655    Order Status: Completed Updated: 03/29/21 0627     Ventricular Rate 59 BPM      Atrial Rate 59 BPM      P-R Interval 142 ms      QRS Duration 94 ms      Q-T Interval 420 ms      QTC Calculation (Bezet) 415 ms      Calculated P Axis 30 degrees      Calculated R Axis 22 degrees      Calculated T Axis 74 degrees      Diagnosis --     Sinus bradycardia  Otherwise normal ECG  When compared with ECG of 13-APR-2020 20:30,  No significant change was found                 Signed By: Booker BECKER Phone [phone] supervised    March 29, 2021      I have independently evaluated and examined the patient. All relevant labs and testing data's are reviewed. Care plan discussed and updated after review.     Denise Block MD

## 2021-03-29 NOTE — ED NOTES
Supervisor made aware this patient does not have neuro sx. She reports she will review chart and make decision on bed status.

## 2021-03-29 NOTE — PROGRESS NOTES
D/C order noted for today. Orders reviewed. No needs identified at this time. Patient's sister will be transporting patient home today at time of discharge. CM remains available if needed.        Zia Funes, [phone]

## 2021-03-29 NOTE — ED NOTES
Attempt to call report . After waiting 10 min, supervisor called to let us know this patient had stroke symptoms and was cancelling bed assignment.

## 2021-03-29 NOTE — PROGRESS NOTES
Problem: Falls - Risk of  Goal: *Absence of Falls  Description: Document [place] Fall Risk and appropriate interventions in the flowsheet. Outcome: Progressing Towards Goal  Note: Fall Risk Interventions:            Medication Interventions: Teach patient to arise slowly                   Problem: Falls - Risk of  Goal: *Absence of Falls  Description: Document [place] Fall Risk and appropriate interventions in the flowsheet.   Outcome: Progressing Towards Goal  Note: Fall Risk Interventions:            Medication Interventions: Teach patient to arise slowly                   Problem: Patient Education: Go to Patient Education Activity  Goal: Patient/Family Education  Outcome: Progressing Towards Goal     Problem: Pain  Goal: *Control of Pain  Outcome: Progressing Towards Goal  Goal: *PALLIATIVE CARE:  Alleviation of Pain  Outcome: Progressing Towards Goal     Problem: Anxiety  Goal: *Alleviation of anxiety  Outcome: Progressing Towards Goal  Goal: *Alleviation of anxiety (Palliative Care)  Outcome: Progressing Towards Goal     Problem: Patient Education: Go to Patient Education Activity  Goal: Patient/Family Education  Outcome: Progressing Towards Goal

## 2021-03-29 NOTE — PROGRESS NOTES
Reason for Admission:  Hypertensive urgency [I16.0]  Chest pain [R07.9]                 RUR Score:    7%            Plan for utilizing home health:    No, not at this time. Patient is ambulatory and self-care. Likelihood of Readmission:   LOW                         Transition of Care Plan:              Initial assessment completed with patient. Cognitive status of patient: oriented to time, place, person and situation. Face sheet information confirmed:  yes. The patient designates her friend Claudia Goodrich [phone] to participate in her discharge plan and to receive any needed information. This patient lives in a single family home with her grandaughter, with no steps to enter. Patient is able to navigate steps as needed. Prior to hospitalization, patient was considered to be independent with ADLs/IADLS : yes . Patient has a current ACP document on file: no.      Healthcare Decision Maker:     Click here to complete [place] including selection of the Healthcare Decision Maker Relationship (ie \"Primary\")    The patient's sister will be available to transport patient home upon discharge. The patient already has none reported,  medical equipment available in the home. Patient is not currently active with home health. Patient has not stayed in a skilled nursing facility or rehab. This patient is on dialysis :no.    Currently, the discharge plan is Home with Family Assistance. The patient states that she can obtain her medications from the pharmacy, and take her medications as directed. Patient's current insurance is [employer]. Care Management Interventions  PCP Verified by CM:  Yes  Mode of Transport at Discharge: Self(Patient's sister will be transporting patient home at time of discharge.)  Transition of Care Consult (CM Consult): Discharge Planning  Physical Therapy Consult: No  Occupational Therapy Consult: No  Speech Therapy Consult: No  Current Support Network:  Other(Patient's grandaujoseph lives her.)  Confirm Follow Up Transport: Family  Discharge Location  Discharge Placement: Home with family assistance        Annabelle Morton RN  Case Management [phone]

## 2021-03-29 NOTE — PROGRESS NOTES
conducted an initial consultation and Spiritual Assessment for Carmina Sanchez, who is a 56 y.o.,female. Patients Primary Language is: [place]. According to the patients EMR Mosque Affiliation is: [facility].     The reason the Patient came to the hospital is:   Patient Active Problem List    Diagnosis Date Noted    Chest pain 03/28/2021    Hypertensive urgency 03/28/2021    Displaced transverse fracture of right patella, initial encounter for closed fracture 04/15/2020    Fibromyalgia 04/15/2020    Patella fracture 04/15/2020    Coronary artery disease involving native coronary artery of native heart 02/07/2017    S/P angioplasty with stent 02/07/2017    Cervical stenosis of spine 02/18/2015        The  provided the following Interventions:  Initiated a relationship of care and support. Explored issues of aylin, belief, spirituality and Mu-ism/ritual needs while hospitalized. Listened empathically. Patient inquired about the Tristanian / (Mono Monreal) who bought one of her puppies long time ago. Provided chaplaincy education. Provided information about Spiritual Care Services. Offered prayer and assurance of continued prayers on patient's behalf. Chart reviewed. The following outcomes where achieved:  Patient shared limited information about both her medical narrative and spiritual journey/beliefs.  confirmed Patient's [facility] Mosque Affiliation. Patient processed feeling about current hospitalization. Patient expressed gratitude for 's visit. Assessment:  Patient does not have any Mu-ism/cultural needs that will affect patients preferences in health care. There are no spiritual or Mu-ism issues which require intervention at this time. Plan:  Chaplains will continue to follow and will provide pastoral care on an as needed/requested basis.    recommends bedside caregivers page  on duty if patient shows signs of acute spiritual or emotional distress.     Catrachita Ayala  Spiritual Care   [phone]

## 2021-03-29 NOTE — ED NOTES
TRANSFER - OUT REPORT:    Verbal report given to CARMEN Soto RN  on Hira Bobo  being transferred to [facility]  for routine progression of care       Report consisted of patients Situation, Background, Assessment and   Recommendations(SBAR). Information from the following report(s) ED Summary was reviewed with the receiving nurse. Lines:   Peripheral IV 03/28/21 Right Antecubital (Active)   Site Assessment Clean, dry, & intact 03/28/21 1715   Phlebitis Assessment 0 03/28/21 1715   Infiltration Assessment 0 03/28/21 1715   Dressing Status Clean, dry, & intact 03/28/21 1715   Dressing Type Transparent 03/28/21 1715   Hub Color/Line Status Pink;Patent 03/28/21 1715        Opportunity for questions and clarification was provided.       Patient transported with:   Monitor  Patient's medications from home

## 2021-03-29 NOTE — DISCHARGE SUMMARY
Date of Admission: 3/28/2021      Assessment:   Atypical chest pain, suspect musculoskeletal etiology over cardiac: EKG without ST T wave elevation or depression. No significant change from prior EKGs. Troponin negative x2. CTA and chest x-ray currently pending  Hypertensive emergency  Diet controlled diabetes mellitus type 2  History of coronary artery disease with stent x1 in 2017 at [facility]  History of cervical spine stenosis with surgical intervention  Reported history of anemia: Currently hemoglobin within normal limits  Plan:   Follow-up CTA of chest  Follow-up chest x-ray  Cardiology has been consulted and Dr. Cleopatra Grant  will see the patient this morning  Continue home medications including aspirin, Lipitor, beta-blocker, lisinopril. Will give Toradol in place of morphine  Patient is currently n.p.o. in the event cardiology deems she needs a stress test  CBC and BMP in a.m. Hb A1c, lipid panel    Garnell Bence D.O. Internal Medicine and Infectious Diseases      Subjective:    Patient is a 56 y. o.female who is being evaluated for chest pain. Ms. Ashley Joseph is a pleasant 56-year-old female with a past medical history significant for chronic anemia, hypertension, coronary artery disease with prior stenting in 2017, diet-controlled diabetes mellitus type 2, dyslipidemia, and cervical spine spine stenosis who is presenting to the emergency department at [facility] with complaints of left-sided chest pain. He notes that her pain had started about 3 days prior to her presentation. Predominantly on the left sternal border however it moves throughout her chest into her back shoulder and fingers. She states that it generally is a dull achy pain but she started to have a stabbing pain along with sternal border when she moves or twists in a particular way. She was given morphine in the ER at [facility] which relieved her symptoms.   At the time of my visit symptoms were starting to return as the morphine is started to wear off. She denies any associated shortness of breath, lower extremity edema jaw pain. She denies any palpitations. She has a mild headache as well. No fevers or chills. She states that her prior chest pain had been slightly similar to this although had not lasted quite as long. Initial evaluation in the [facility] emergency department revealed troponins which were negative x2 sets and EKG without ST elevation or depression. She does have a sinus bradycardia. CTA as well as chest x-ray have been ordered and are currently pending.         Past Medical History:   Diagnosis Date    Anxiety     Arthritis     CAD (coronary artery disease)     ONE VESSEL STENT 3/2017 [facility] Cervical stenosis of spine     Coronary artery disease involving native coronary artery of native heart 2017    Deficiency anemia     Depression     Diabetes ([facility] [address]     Diet controlled (per pt.)    Esophageal reflux     FOOD RELATED HEART BURN     Exercise tolerance finding 07/2018    WALKS 20 MINUTES 2 DAYS, HOUSE AND YARD WORK, CAN CLIMBS STAIRS AND LAY FLAT-- DENIES SOB OR CHEST PAINS    Fibromyalgia     GERD (gastroesophageal reflux disease)     Gross hematuria     HLD (hyperlipidemia)     Hypercholesteremia     Hypertension     Kidney stone     Mitral valve prolapse     NO PROBLEMS     PONV (postoperative nausea and vomiting)     Right kidney stone     S/P angioplasty with stent 2017    Cath 2/7/17, Dr. Lety Landry, [facility]    Smoker     Wears glasses      Past Surgical History:   Procedure Laterality Date    HX CHOLECYSTECTOMY      HX CORONARY STENT PLACEMENT  03/2017    ONE VESSEL STENT    HX OTHER SURGICAL      SPINE LO cervical spine    HX REFRACTIVE SURGERY Bilateral     HX SHOULDER ARTHROSCOPY Right 07/2018    HX TUBAL LIGATION Bilateral     HX UROLOGICAL  05/10/2019    S/p cystoscopy, exchange of JJ stent, right URS, laser lithotripsy, and removal of stone by  [facility] at [facility]     History reviewed. No pertinent family history.   Medications reviewed as below:   Current Facility-Administered Medications   Medication Dose Route Frequency Provider Last Rate Last Admin    heparin (porcine) injection 5,000 Units  5,000 Units SubCUTAneous Q12H George Chandler MD   5,000 Units at 03/28/21 2102     Allergies   Allergen Reactions    Oxycodone-Acetaminophen Other (comments) and Itching     Social History     Socioeconomic History    Marital status:      Spouse name: Not on file    Number of children: Not on file    Years of education: Not on file    Highest education level: Not on file   Occupational History    Not on file   Social Needs    Financial resource strain: Not on file    Food insecurity     Worry: Not on file     Inability: Not on file    Transportation needs     Medical: Not on file     Non-medical: Not on file   Tobacco Use    Smoking status: Current Every Day Smoker     Packs/day: 1.00     Years: 30.00     Pack years: 30.00     Types: Cigarettes    Smokeless tobacco: Never Used   Substance and Sexual Activity    Alcohol use: No    Drug use: No    Sexual activity: Not on file   Lifestyle    Physical activity     Days per week: Not on file     Minutes per session: Not on file    Stress: Not on file   Relationships    Social connections     Talks on phone: Not on file     Gets together: Not on file     Attends Yazidi service: Not on file     Active member of club or organization: Not on file     Attends meetings of clubs or organizations: Not on file     Relationship status: Not on file    Intimate partner violence     Fear of current or ex partner: Not on file     Emotionally abused: Not on file     Physically abused: Not on file     Forced sexual activity: Not on file   Other Topics Concern    Not on file   Social History Narrative    Not on file        Review of Systems    Negative Unless BOLDED    General: fevers, chills, myalgias, arthralgias, unexplained weight loss, malaise, fatigue. HEENT:  headaches,sinus pain or presure, recent URI, recent dental procedures;  tinnitus, hearing loss , visual changes, catarats, dizziness or blurred vision  PUlMONARY:  cough , shortness of breath, sputum production, hx of asthma or COPD. previous treatement for TB or PPD. Cardiovascular: chest pain, previous CAD/MI, vavlular heart disease,  murmurs  GI:   nausea, vomiting, diarrhea, abdominal pain, prior C.diff  :  urinary frequency, dysuria, hematuria, bladder incontinence. Neurologic:  seizures, syncope or prior CVA/TIA, confusion, memory impairment, neuropathy  Musculoskeletal:  myalgias arthralgias, joint pain/ swelling,  back pain  Skin:  Purities,  recurrent cellulitis,  chronic stasis ulcer, diabetic foot ulcers  Endocrine: polyuria, polydipsia, hair loss, weight gain  Psych: Denies depression or treatment by a psychiatrist/psycologist  Heme-Onc: prior DVT, easy bruising, fatigue, malignancy        Objective:        Visit Vitals  BP (!) 170/79 (BP 1 Location: Left upper arm, BP Patient Position: At rest)   Pulse (!) 56   Temp 97.9 °F (36.6 °C)   Resp 17   Ht 5' 2\" (1.575 m)   Wt 68.9 kg (152 lb)   SpO2 91%   Breastfeeding No   BMI 27.80 kg/m²     Temp (24hrs), Av.6 °F (36.4 °C), Min:97 °F (36.1 °C), Max:97.9 °F (36.6 °C)        General:   awake alert and oriented   Skin:   no rashes or skin lesions noted on limited exam   HEENT:  Normocephalic, atraumatic, PERRL, EOMI, no scleral icterus or pallor; no conjunctival hemmohage;  nasal and oral mucous are moist and without evidence of lesions. No thrush. Dentition good. Neck supple, no bruits.    Lymph Nodes:   no cervical, axillary or inguinal adenopathy   Lungs:   non-labored, bilaterally clear to aspiration- no crackles wheezes rales or rhonchi   Heart:  RRR, s1 and s2; no murmurs rubs or gallops, no edema, + pedal pulses, reproducible pain with deep palpation along the left sternal border around ribs 3 and 4 slight increase in warmth on left compared to right. Abdomen:  soft, non-distended, active bowel sounds, no hepatomegaly, no splenomegaly. Non-tender   Genitourinary:  deferred   Extremities:   no clubbing, cyanosis; no joint effusions or swelling; Full ROM of all large joints to the upper and lower extremities; muscle mass appropriate for age   Neurologic:  No gross focal sensory abnormalities; 5/5 muscle strength to upper and lower extremities. Speech appropirate. Cranial nerves intact   Psychiatric:   appropriate and interactive. Labs: Results:   Chemistry Recent Labs     03/28/21  1719   *      K 3.6      CO2 26   BUN 14   CREA 0.52*   CA 8.8   AGAP 8   BUCR 27*   *   TP 7.3   ALB 3.5   GLOB 3.8   AGRAT 0.9      CBC w/Diff Recent Labs     03/28/21  1719   WBC 10.7   RBC 4.56   HGB 14.2   HCT 42.4      GRANS 50   LYMPH 38   EOS 2            No results found for: SDES Lab Results   Component Value Date/Time    Culture result: NO GROWTH 1 DAY 02/05/2015 10:37 AM          Imaging:      All imaging reviewed from Admission to present as per radiology interpretation in [address] [place] [address]

## 2021-03-29 NOTE — ED NOTES
Pt aware blood to be drawn 2030 for repeat enzymes. CIPRIANO Hartman at bedside to update patient and address concern.

## 2021-03-29 NOTE — DISCHARGE SUMMARY
Discharge Summary    Patient: Ashlee Saldana MRN: [id number]  CSN: [id number]    YOB: 1960  Age: 56 y.o. Sex: female    DOA: 3/28/2021 LOS:  LOS: 1 day   Discharge Date:      Admission Diagnoses: Hypertensive urgency [I16.0]  Chest pain [R07.9]    Discharge Diagnoses:    Problem List as of 3/29/2021 Date Reviewed: 4/15/2020          Codes Class Noted - Resolved    Chest pain ICD-10-CM: R07.9  ICD-9-CM: 786.50  3/28/2021 - Present        Hypertensive urgency ICD-10-CM: I16.0  ICD-9-CM: 401.9  3/28/2021 - Present        Displaced transverse fracture of right patella, initial encounter for closed fracture ICD-10-CM: S82.031A  ICD-9-CM: 822.0  4/15/2020 - Present        Fibromyalgia (Chronic) ICD-10-CM: M79.7  ICD-9-CM: 729.1  4/15/2020 - Present        Patella fracture ICD-10-CM: S82.009A  ICD-9-CM: 822.0  4/15/2020 - Present        Coronary artery disease involving native coronary artery of native heart (Chronic) ICD-10-CM: I25.10  ICD-9-CM: 414.01  2/7/2017 - Present        S/P angioplasty with stent ICD-10-CM: Z95.820  ICD-9-CM: V45.89  2/7/2017 - Present    Overview Signed 2/7/2017  4:04 PM by Melissa Carrion PA-C     Cath 2/7/17, Dr. Melissa Urbina, [facility]:  1. Single vessel CAD with abnormal FFR of 0.64  2. Successful PTCA and PCI of LCX with 2.5 x 20 mm Synergy drug-eluting stent postdilated to 2.65 mm proximally tapering to 2.62 mm distally             Cervical stenosis of spine ICD-10-CM: M48.02  ICD-9-CM: 723.0  2/18/2015 - Present              Discharge Condition: Stable    PHYSICAL EXAM  Visit Vitals  BP (!) 154/71 (BP 1 Location: Left upper arm, BP Patient Position: Sitting)   Pulse 67   Temp 97.5 °F (36.4 °C)   Resp 18   Ht 5' 2\" (1.575 m)   Wt 68.9 kg (152 lb)   SpO2 94%   Breastfeeding No   BMI 27.80 kg/m²       GENERAL: alert, cooperative, no distress, appears stated age  LUNG: clear to auscultation bilaterally  HEART: regular rate and rhythm, S1, S2 normal, no murmur, click, rub or gallop.  TTP present over bilateral costosternal border  ABDOMEN: soft, non-tender. Bowel sounds normal. No masses,  no organomegaly  EXTREMITIES:  extremities normal, atraumatic, no cyanosis or edema  NEUROLOGIC: AOx3. Gait normal. Motor strength normal and symmetric. Cranial nerves 2-12 and sensation grossly intact. Hospital Course:   17-year-old female with a history of chronic anemia, hypertension, coronary artery disease with stents in 2017, diet-controlled diabetes type 2, dyslipidemia and cervical spine stenosis presenting for admission for chest pain with radiation to L arm found to have a normal nuclear cardiac stress test.    Chest pain with left arm radiation: Initially was admitted with 3 days of ongoing chest pain that radiated into her back, shoulder, and fingers. Troponin was negative x2. EKG showed sinus bradycardia and was otherwise normal including no ST changes. CT chest abdomen and pelvis with and without contrast showed no concern for aortic abnormalities. Nuclear cardiac stress testing with Uvaldo Amsler was negative for perfusion abnormalities and indicated low risk stress test.  Tenderness palpation over the costosternal angle indicated at least a component of costochondritis. With low concern for ACS, patient will be discharged to home with outpatient follow-up. Consults:   Cardiology    Lab/Data Review:  Labs: Results:       Chemistry Recent Labs     03/28/21  1719   *      K 3.6      CO2 26   BUN 14   CREA 0.52*   CA 8.8   AGAP 8   BUCR 27*   *   TP 7.3   ALB 3.5   GLOB 3.8   AGRAT 0.9      CBC w/Diff Recent Labs     03/28/21  1719   WBC 10.7   RBC 4.56   HGB 14.2   HCT 42.4      GRANS 50   LYMPH 38   EOS 2      Cardiac Enzymes Recent Labs     03/28/21  1719   CPK 50   CKND1 CALCULATION NOT PERFORMED WHEN RESULT IS BELOW LINEAR LIMIT      Coagulation No results for input(s): PTP, INR, APTT, INREXT in the last 72 hours.     Lipid Panel Lab Results   Component Value Date/Time    Cholesterol, total 133 03/29/2021 09:00 AM    HDL Cholesterol 44 03/29/2021 09:00 AM    LDL, calculated 69.4 03/29/2021 09:00 AM    VLDL, calculated 19.6 03/29/2021 09:00 AM    Triglyceride 98 03/29/2021 09:00 AM    CHOL/HDL Ratio 3.0 03/29/2021 09:00 AM      BNP No results for input(s): BNPP in the last 72 hours. Liver Enzymes Recent Labs     03/28/21  1719   TP 7.3   ALB 3.5   *      Thyroid Studies No results found for: T4, T3U, TSH, TSHEXT       No results found for: SDES Lab Results   Component Value Date/Time    Culture result: NO GROWTH 1 DAY 02/05/2015 10:37 AM        Imaging:     Cta Chest Abd Pelv W Wo Cont    Result Date: 3/29/2021  1. No acute aortic abnormalities. 2.  Multiple small lung nodules. 3.  Stable right lobe hypervascular enhancing lesion, suggestive of flash-filling hemangioma. 4.  Tiny punctate calcification in the right kidney. 5.  Diverticulosis coli. Xr Chest Port    Result Date: 3/29/2021  No radiographic evidence of acute cardiopulmonary process. Discharge Medications:     Current Discharge Medication List      CONTINUE these medications which have CHANGED    Details   lisinopriL (PRINIVIL, ZESTRIL) 20 mg tablet Take 1 Tab by mouth daily. Qty: 30 Tab, Refills: 0         CONTINUE these medications which have NOT CHANGED    Details   atorvastatin (LIPITOR) 20 mg tablet Take  by mouth daily. aspirin delayed-release 81 mg tablet Take  by mouth daily. METOPROLOL TARTRATE (LOPRESSOR PO) Take 50 mg by mouth two (2) times a day. escitalopram oxalate (LEXAPRO) 5 mg tablet Take 10 mg by mouth daily. promethazine (PHENERGAN) 25 mg tablet Take 1 Tab by mouth every eight (8) hours as needed for Nausea. Indications: prevent nausea and vomiting after surgery  Qty: 20 Tab, Refills: 0      senna-docusate (PERICOLACE) 8.6-50 mg per tablet Take 1 Tab by mouth two (2) times a day.  Indications: constipation  Qty: 30 Tab, Refills: 0             Activity: activity as tolerated    Diet: Cardiac Diet    Wound Care: None needed    Follow-up: with PCP, Sridhar Conway MD in 7-10days    Minutes spent on discharge: >30 minutes spent coordinating this discharge (review instructions/follow-up, prescriptions, preparing report for sign off)

## 2021-03-29 NOTE — PROGRESS NOTES
Patient was given 9.67 milliCuries of 99mTc-Sestamibi for the resting images. Patient was also given 33.0 milliCuries of 99mTc-Sestamibi for the stress images. Injected with 0.4mg Lexiscan. Patient's armband was left on and sent to echo.

## 2021-03-29 NOTE — PROGRESS NOTES
Patient seen and evaluated, admitted earlier this morning for atypical chest pain, EKG without ST-T wave changes, troponin negative x2, CTA chest negative for PE and chest x-ray shows no acute cardiopulmonary disease. Patient is a chronic smoker and smokes 1 pack/day. She has been counseled to stop smoking. Patient has a known history of coronary artery disease and has had a stent placed in the past.  Cardiology consulted, given patient's current presentation and history of smoking, will get nuclear stress test.  Further treatment plan as outlined in H&P and cardiology note, will continue to follow.

## 2021-03-29 NOTE — PROGRESS NOTES
Problem: Falls - Risk of  Goal: *Absence of Falls  Description: Document Lorri France Fall Risk and appropriate interventions in the flowsheet.   Outcome: Progressing Towards Goal  Note: Fall Risk Interventions:            Medication Interventions: Teach patient to arise slowly                   Problem: Patient Education: Go to Patient Education Activity  Goal: Patient/Family Education  Outcome: Progressing Towards Goal     Problem: Pain  Goal: *Control of Pain  Outcome: Progressing Towards Goal  Goal: *PALLIATIVE CARE:  Alleviation of Pain  Outcome: Progressing Towards Goal     Problem: Patient Education: Go to Patient Education Activity  Goal: Patient/Family Education  Outcome: Progressing Towards Goal     Problem: Anxiety  Goal: *Alleviation of anxiety  Outcome: Progressing Towards Goal  Goal: *Alleviation of anxiety (Palliative Care)  Outcome: Progressing Towards Goal     Problem: Patient Education: Go to Patient Education Activity  Goal: Patient/Family Education  Outcome: Progressing Towards Goal

## 2022-03-18 PROBLEM — S82.031A DISPLACED TRANSVERSE FRACTURE OF RIGHT PATELLA, INITIAL ENCOUNTER FOR CLOSED FRACTURE: Status: ACTIVE | Noted: 2020-04-15

## 2022-03-19 PROBLEM — Z95.820 S/P ANGIOPLASTY WITH STENT: Status: ACTIVE | Noted: 2017-02-07

## 2022-03-19 PROBLEM — R07.9 CHEST PAIN: Status: ACTIVE | Noted: 2021-03-28

## 2022-03-19 PROBLEM — S82.009A PATELLA FRACTURE: Status: ACTIVE | Noted: 2020-04-15

## 2022-03-19 PROBLEM — M79.7 FIBROMYALGIA: Status: ACTIVE | Noted: 2020-04-15

## 2022-03-20 PROBLEM — I25.10 CORONARY ARTERY DISEASE INVOLVING NATIVE CORONARY ARTERY OF NATIVE HEART: Status: ACTIVE | Noted: 2017-02-07

## 2022-03-20 PROBLEM — I16.0 HYPERTENSIVE URGENCY: Status: ACTIVE | Noted: 2021-03-28

## 2022-08-22 ENCOUNTER — OFFICE VISIT (OUTPATIENT)
Dept: ORTHOPEDIC SURGERY | Age: 62
End: 2022-08-22
Payer: MEDICARE

## 2022-08-22 VITALS
BODY MASS INDEX: 27.8 KG/M2 | WEIGHT: 152 LBS | TEMPERATURE: 97.2 F | OXYGEN SATURATION: 96 % | RESPIRATION RATE: 18 BRPM

## 2022-08-22 DIAGNOSIS — Q76.1 CERVICAL FUSION SYNDROME: ICD-10-CM

## 2022-08-22 DIAGNOSIS — M48.02 CERVICAL STENOSIS OF SPINE: ICD-10-CM

## 2022-08-22 DIAGNOSIS — M47.812 CERVICAL FACET JOINT SYNDROME: Primary | ICD-10-CM

## 2022-08-22 DIAGNOSIS — M79.18 MYOFASCIAL PAIN: ICD-10-CM

## 2022-08-22 DIAGNOSIS — M62.838 MUSCLE SPASM: ICD-10-CM

## 2022-08-22 PROCEDURE — G8419 CALC BMI OUT NRM PARAM NOF/U: HCPCS | Performed by: PHYSICAL MEDICINE & REHABILITATION

## 2022-08-22 PROCEDURE — G8432 DEP SCR NOT DOC, RNG: HCPCS | Performed by: PHYSICAL MEDICINE & REHABILITATION

## 2022-08-22 PROCEDURE — 99203 OFFICE O/P NEW LOW 30 MIN: CPT | Performed by: PHYSICAL MEDICINE & REHABILITATION

## 2022-08-22 PROCEDURE — G8427 DOCREV CUR MEDS BY ELIG CLIN: HCPCS | Performed by: PHYSICAL MEDICINE & REHABILITATION

## 2022-08-22 PROCEDURE — 3017F COLORECTAL CA SCREEN DOC REV: CPT | Performed by: PHYSICAL MEDICINE & REHABILITATION

## 2022-08-22 RX ORDER — METOPROLOL TARTRATE 50 MG/1
TABLET ORAL
COMMUNITY
Start: 2022-07-12

## 2022-08-22 RX ORDER — ESCITALOPRAM OXALATE 20 MG/1
20 TABLET ORAL DAILY
COMMUNITY
Start: 2022-07-29

## 2022-08-22 RX ORDER — AMLODIPINE BESYLATE 5 MG/1
TABLET ORAL
COMMUNITY
Start: 2022-03-30

## 2022-08-22 RX ORDER — HYDROCODONE BITARTRATE AND ACETAMINOPHEN 7.5; 325 MG/1; MG/1
TABLET ORAL
COMMUNITY
Start: 2022-07-12

## 2022-08-22 NOTE — PATIENT INSTRUCTIONS
Neck Arthritis: Exercises  Introduction  Here are some examples of exercises for you to try. The exercises may be suggested for a condition or for rehabilitation. Start each exercise slowly. Ease off the exercises if you start to have pain. You will be told when to start these exercises and which ones will work best for you. How to do the exercises  Neck stretches to the side    This stretch works best if you keep your shoulder down as you lean away from it. To help you remember to do this, start by relaxing your shoulders and lightly holding on to your thighs or your chair. Tilt your head toward your shoulder and hold for 15 to 30 seconds. Let the weight of your head stretch your muscles. Repeat 2 to 4 times toward each shoulder. Chin tuck    Lie on the floor with a rolled-up towel under your neck. Your head should be touching the floor. Slowly bring your chin toward your chest.  Hold for a count of 6, and then relax for up to 10 seconds. Repeat 8 to 12 times. Active cervical rotation    Sit in a firm chair, or stand up straight. Keeping your chin level, turn your head to the right, and hold for 15 to 30 seconds. Turn your head to the left and hold for 15 to 30 seconds. Repeat 2 to 4 times to each side. Shoulder blade squeeze    While standing, squeeze your shoulder blades together. Do not raise your shoulders up as you are squeezing. Hold for 6 seconds. Repeat 8 to 12 times. Shoulder rolls    Sit comfortably with your feet shoulder-width apart. You can also do this exercise standing up. Roll your shoulders up, then back, and then down in a smooth, circular motion. Repeat 2 to 4 times. Follow-up care is a key part of your treatment and safety. Be sure to make and go to all appointments, and call your doctor if you are having problems. It's also a good idea to know your test results and keep a list of the medicines you take. Where can you learn more?   Go to http://www.gray.com/  Enter A376 in the search box to learn more about \"Neck Arthritis: Exercises. \"  Current as of: July 1, 2021               Content Version: 13.2  © 3731-4572 [employer]. Care instructions adapted under license by [employer] (which disclaims liability or warranty for this information). If you have questions about a medical condition or this instruction, always ask your healthcare professional. [place] [address] any warranty or liability for your use of this information.

## 2022-08-22 NOTE — PROGRESS NOTES
[facility]  [address] [place], [facility] [address]  Phone: [phone]  Fax: [phone]    NEW PATIENT  Pt's YOB: 1960    ASSESSMENT   Diagnoses and all orders for this visit:    1. Cervical facet joint syndrome    2. Muscle spasm    3. Cervical stenosis of spine    4. Myofascial pain    5. Cervical fusion syndrome       IMPRESSION AND PLAN:  Elver Oleary is a 57 y.o. female with history of cervical pain x several years. Pt presents to the office today as a new patient. Pt reports pain in her cervical region with radicular symptoms in both of the arms and shoulders. She is taking Tylenol with relief but has discontinued taking ibuprofen due to high blood pressure and does not react well with steroids. 1) Pt was given information on cervical arthritis exercises and facet injections. 2) Discussed treatment options with the patient including steroid injections, use of Thera Cane, or radiofrequency ablation. 3) Cervical spine MRI from 08/13/2022 was reviewed with the pt at this visit. 4) She was given information on the [employer]. 5) Pt was offered a Medrol Dosepak but deferred at this time. 6) Ms. Nate Amezquita has a reminder for a \"due or due soon\" health maintenance. I have asked that she contact her primary care provider, Delores Jones MD, for follow-up on this health maintenance. 7)  demonstrated consistency with prescribing. 8) Surgical evaluation may also be considered for spinal stenosis  Follow-up and Dispositions    Return in about 2 months (around 10/22/2022) for Medication follow up. HISTORY OF PRESENT ILLNESS:  Elver Oleary is a 57 y.o. female with history of cervical pain x several years. Pt presents to the office today as a new patient. Pt reports pain in her cervical region with radicular symptoms in both of the arms and shoulders.  She admits to previously undergoing physical therapy, epidural injections, steroid injections, and dry needling but with minimal relief. Pt mentions the providers who treated her at that time thought the pain was coming from her right shoulder but after undergoing a surgery in the right shoulder there was no relief. She notes previously undergoing rotator cuff surgery on the right shoulder in 2018 out of [facility] and multilevel fusions in her cervical spine in 2014 by Dr. Cristina Edgar. Pt reports pain worsening since surgery in 2014, a history of fibromyalgia, previous surgery on her right knee, and admits to previously undergoing trigger point injections. She is taking Tylenol with relief but has discontinued ibuprofen due to high blood pressure and does not react well with steroids. Pt at this time desires to proceed with the [facility]. Of note, pt works as a .      Pain Scale: 5/10     PCP: Rashid Appiah MD    Past Medical History:   Diagnosis Date    Anxiety     Arthritis     CAD (coronary artery disease)     ONE VESSEL STENT 3/2017 [facility]     Cervical stenosis of spine     Coronary artery disease involving native coronary artery of native heart 2017    Deficiency anemia     Depression     Diabetes (HCC)     Diet controlled (per pt.)    Esophageal reflux     FOOD RELATED HEART BURN     Exercise tolerance finding 07/2018    WALKS 20 MINUTES 2 DAYS, HOUSE AND YARD WORK, CAN CLIMBS STAIRS AND LAY FLAT-- DENIES SOB OR CHEST PAINS    Fibromyalgia     GERD (gastroesophageal reflux disease)     Gross hematuria     HLD (hyperlipidemia)     Hypercholesteremia     Hypertension     Kidney stone     Mitral valve prolapse     NO PROBLEMS     PONV (postoperative nausea and vomiting)     Right kidney stone     S/P angioplasty with stent 2017    Cath 2/7/17, Dr. Enoch Barnes, [facility]    Smoker     Wears glasses         Social History     Socioeconomic History    Marital status:      Spouse name: Not on file    Number of children: Not on file    Years of education: Not on file    Highest education level: Not on file   Occupational History    Not on file   Tobacco Use    Smoking status: Every Day     Packs/day: 1.00     Years: 30.00     Pack years: 30.00     Types: Cigarettes    Smokeless tobacco: Never   Substance and Sexual Activity    Alcohol use: No    Drug use: No    Sexual activity: Not on file   Other Topics Concern    Not on file   Social History Narrative    Not on file     Social Determinants of Health     Financial Resource Strain: Not on file   Food Insecurity: Not on file   Transportation Needs: Not on file   Physical Activity: Insufficiently Active    Days of Exercise per Week: 3 days    Minutes of Exercise per Session: 30 min   Stress: Not on file   Social Connections: Not on file   Intimate Partner Violence: Not At Risk    Fear of Current or Ex-Partner: No    Emotionally Abused: No    Physically Abused: No    Sexually Abused: No   Housing Stability: Not on file       Current Outpatient Medications   Medication Sig Dispense Refill    amLODIPine (NORVASC) 5 mg tablet 1 tablet      HYDROcodone-acetaminophen (NORCO) 7.5-325 mg per tablet TAKE 1 TABLET BY MOUTH 4 TIMES DAILY AS NEEDED FOR CHRONIC PAIN FOR 7 DAYS      escitalopram oxalate (LEXAPRO) 20 mg tablet Take 20 mg by mouth daily. metoprolol tartrate (LOPRESSOR) 50 mg tablet TAKE 1 TABLET BY MOUTH TWICE DAILY FOR BLOOD PRESSURE FOR 90 DAYS      lisinopriL (PRINIVIL, ZESTRIL) 20 mg tablet Take 1 Tab by mouth daily. 30 Tab 0    atorvastatin (LIPITOR) 20 mg tablet Take  by mouth daily. aspirin delayed-release 81 mg tablet Take  by mouth daily. METOPROLOL TARTRATE (LOPRESSOR PO) Take 50 mg by mouth two (2) times a day. escitalopram oxalate (LEXAPRO) 5 mg tablet Take 10 mg by mouth daily. promethazine (PHENERGAN) 25 mg tablet Take 1 Tab by mouth every eight (8) hours as needed for Nausea.  Indications: prevent nausea and vomiting after surgery 20 Tab 0    senna-docusate (PERICOLACE) 8.6-50 mg per tablet Take 1 Tab by mouth two (2) times a day. Indications: constipation 30 Tab 0       Allergies   Allergen Reactions    Oxycodone-Acetaminophen Other (comments) and Itching       REVIEW OF SYSTEMS    Constitutional: Negative for fever, chills, or weight change. Respiratory: Negative for cough or shortness of breath. Cardiovascular: Negative for chest pain or palpitations. Gastrointestinal: Negative for acid reflux, change in bowel habits, or constipation. Genitourinary: Negative for dysuria and flank pain. Musculoskeletal: Positive for cervical pain. Skin: Negative for rash. Neurological: Negative for headaches, dizziness, or numbness. Endo/Heme/Allergies: Negative for increased bruising. Psychiatric/Behavioral: Negative for difficulty with sleep. As per HPI    PHYSICAL EXAMINATION  Visit Vitals  Temp 97.2 °F (36.2 °C) (Temporal)   Resp 18   Wt 152 lb (68.9 kg)   SpO2 96% Comment: RA   BMI 27.80 kg/m²       Constitutional: Awake, alert, and in no acute distress. HEENT: Normocephalic. Atraumatic. Oropharynx is moist and clear. PERRL. EOMI. Sclerae are nonicteric  Cardiovascular: Regular rate and rhythm  Lungs: Clear to auscultation bilaterally  Abdomen: Soft and nontender. Bowel sounds are present  Neurological: 1+ symmetrical DTRs in the upper extremities. 1+ symmetrical DTRs in the lower extremities. Sensation to light touch is intact. Negative Barber's sign bilaterally. Skin: warm, dry, and intact. Musculoskeletal: Tightness across the upper trapezius region. No pain with extension, axial loading. Minimal pain with forward flexion. No pain with internal or external rotation of her hips. Negative straight leg raise bilaterally. No pain with heel or toe walking. No difficulty with the single leg stance bilaterally. Pain with rotating bilateral shoulders. Mild decreased side to side flexion.        Biceps  Triceps Deltoids Wrist Ext Wrist Flex Hand Intrin   Right +4/5 +4/5 +4/5 +4/5 +4/5 +4/5   Left +4/5 +4/5 +4/5 +4/5 +4/5 +4/5      Hip Flex  Quads Hamstrings Ankle DF EHL Ankle PF   Right +4/5 +4/5 +4/5 +4/5 +4/5 +4/5   Left +4/5 +4/5 +4/5 +4/5 +4/5 +4/5     IMAGING:    Cervical spine MRI from 08/13/2022 personally reviewed with the patient and demonstrated:    MRI Results (most recent):  Results from [facility] [place] encounter on 10/08/13    MRI CERV SPINE WO CONT    Narrative  MRI of cervical spine without contrast    CPT CODE: 13441    HISTORY: Neck pain    COMPARISON: Radiographs September 19, 2013    Procedure:    MRI of the cervical spine was performed without intravenous contrast. Sequences  included: Localizer, sagittal T1, sagittal T2, sagittal inversion recovery, and  axial T2 with fat saturation. FINDINGS:    There is straightening of the normal cervical lordosis. No significant  listhesis. Multilevel osteophyte formation present. No fracture. The visualized posterior fossa contents and prevertebral soft tissues look  normal.    There is moderate osseous fusion along the C2-3 disc, predominantly  posteriorly. There is moderate disc narrowing at C5-6 and C6-7. The cervical  cord is normal in signal, with mild contour deformity at several levels  secondary to degenerative changes. C1-2: Normal    C2-3: Moderate osseous fusion across the disc. Patent canal. No significant  foraminal narrowing. C3-4: Posterior disc osteophyte complex. Mild narrowing of the central canal to  approximately 8 mm. CSF persists around the cord. Mild uncovertebral and facet  arthropathy result in mild left greater than right foraminal narrowing. C4-5: Small posterior disc osteophyte complex, with mild impression upon the  ventral thecal sac. Central canal is patent. Uncovertebral and facet  arthropathy results in mild left greater than right foraminal narrowing.     C5-6: Moderate posterior disc osteophyte complex, with impression upon the  ventral thecal sac and flattening of the ventral aspect of the cord. Central  canal is approximately 7 mm. Small amount of CSF persists around the cord. Mild  uncovertebral and facet arthropathy results in mild left greater than right  foraminal narrowing. C6-7: Large posterior disc osteophyte complex causes impression upon the  ventral thecal sac and distortion of the ventral aspect of the cord. Central  canal narrowed  to 6 mm. Little CSF persists around the cord at this level. Uncovertebral and facet arthropathy results in moderate bilateral foraminal  narrowing. C7-T1: Tiny posterior disc osteophyte complex, with overall patent canal. No  significant foraminal narrowing. Impression  :    Multilevel degenerative changes, with multilevel mild to moderate central canal  narrowing, most prominently at C6-7. Multilevel foraminal narrowing as  described above. No definite cord signal abnormality at this time. Written by Liane Cantrell, as dictated by Bhumika Baptiste MD.  I, Dr. Bhumika Baptiste confirm that all documentation is accurate.

## 2022-08-22 NOTE — PROGRESS NOTES
Alinda Osgood presents today for   Chief Complaint   Patient presents with    Neck Pain    Shoulder Pain       Is someone accompanying this pt? no    Is the patient using any DME equipment during OV? no    Depression Screening:  No flowsheet data found. Learning Assessment:  No flowsheet data found. Abuse Screening:  No flowsheet data found. Fall Risk  No flowsheet data found. OPIOID RISK TOOL  No flowsheet data found. Coordination of Care:  1. Have you been to the ER, urgent care clinic since your last visit? no  Hospitalized since your last visit? no    2. Have you seen or consulted any other health care providers outside of the [address] [place] [address] since your last visit? no Include any pap smears or colon screening.  no

## 2022-08-31 ENCOUNTER — OFFICE VISIT (OUTPATIENT)
Dept: ORTHOPEDIC SURGERY | Age: 62
End: 2022-08-31
Payer: MEDICARE

## 2022-08-31 VITALS
TEMPERATURE: 96.5 F | HEART RATE: 56 BPM | OXYGEN SATURATION: 95 % | BODY MASS INDEX: 27.95 KG/M2 | WEIGHT: 152.8 LBS | RESPIRATION RATE: 14 BRPM

## 2022-08-31 DIAGNOSIS — M48.02 CERVICAL STENOSIS OF SPINE: ICD-10-CM

## 2022-08-31 DIAGNOSIS — M62.838 MUSCLE SPASM: ICD-10-CM

## 2022-08-31 DIAGNOSIS — Q76.1 CERVICAL FUSION SYNDROME: ICD-10-CM

## 2022-08-31 DIAGNOSIS — M79.7 FIBROMYALGIA: ICD-10-CM

## 2022-08-31 DIAGNOSIS — M79.18 MYOFASCIAL PAIN: ICD-10-CM

## 2022-08-31 DIAGNOSIS — M79.10 TRIGGER POINT: ICD-10-CM

## 2022-08-31 DIAGNOSIS — M47.812 CERVICAL FACET JOINT SYNDROME: Primary | ICD-10-CM

## 2022-08-31 PROCEDURE — G8419 CALC BMI OUT NRM PARAM NOF/U: HCPCS | Performed by: PHYSICAL MEDICINE & REHABILITATION

## 2022-08-31 PROCEDURE — 3017F COLORECTAL CA SCREEN DOC REV: CPT | Performed by: PHYSICAL MEDICINE & REHABILITATION

## 2022-08-31 PROCEDURE — 99214 OFFICE O/P EST MOD 30 MIN: CPT | Performed by: PHYSICAL MEDICINE & REHABILITATION

## 2022-08-31 PROCEDURE — G8427 DOCREV CUR MEDS BY ELIG CLIN: HCPCS | Performed by: PHYSICAL MEDICINE & REHABILITATION

## 2022-08-31 PROCEDURE — 96372 THER/PROPH/DIAG INJ SC/IM: CPT | Performed by: PHYSICAL MEDICINE & REHABILITATION

## 2022-08-31 PROCEDURE — G8432 DEP SCR NOT DOC, RNG: HCPCS | Performed by: PHYSICAL MEDICINE & REHABILITATION

## 2022-08-31 RX ORDER — BETAMETHASONE SODIUM PHOSPHATE AND BETAMETHASONE ACETATE 3; 3 MG/ML; MG/ML
12 INJECTION, SUSPENSION INTRA-ARTICULAR; INTRALESIONAL; INTRAMUSCULAR; SOFT TISSUE ONCE
Status: COMPLETED | OUTPATIENT
Start: 2022-08-31 | End: 2022-08-31

## 2022-08-31 RX ORDER — METHYLPREDNISOLONE 4 MG/1
TABLET ORAL
Qty: 1 DOSE PACK | Refills: 0 | Status: SHIPPED | OUTPATIENT
Start: 2022-08-31

## 2022-08-31 RX ORDER — BUPIVACAINE HYDROCHLORIDE 2.5 MG/ML
5 INJECTION, SOLUTION INFILTRATION; PERINEURAL ONCE
Status: COMPLETED | OUTPATIENT
Start: 2022-08-31 | End: 2022-08-31

## 2022-08-31 RX ORDER — LIDOCAINE HYDROCHLORIDE 20 MG/ML
2 INJECTION, SOLUTION INFILTRATION; PERINEURAL ONCE
Status: COMPLETED | OUTPATIENT
Start: 2022-08-31 | End: 2022-08-31

## 2022-08-31 RX ADMIN — BETAMETHASONE SODIUM PHOSPHATE AND BETAMETHASONE ACETATE 12 MG: 3; 3 INJECTION, SUSPENSION INTRA-ARTICULAR; INTRALESIONAL; INTRAMUSCULAR; SOFT TISSUE at 16:43

## 2022-08-31 RX ADMIN — BUPIVACAINE HYDROCHLORIDE 5 MG: 2.5 INJECTION, SOLUTION INFILTRATION; PERINEURAL at 16:45

## 2022-08-31 RX ADMIN — LIDOCAINE HYDROCHLORIDE 40 MG: 20 INJECTION, SOLUTION INFILTRATION; PERINEURAL at 16:41

## 2022-08-31 NOTE — PROGRESS NOTES
[facility]  [address] [place] [address], [facility] [address]  Phone: [phone]  Fax: [phone]    Pt's YOB: 1960    ASSESSMENT   Diagnoses and all orders for this visit:    1. Cervical facet joint syndrome  -     methylPREDNISolone (MEDROL DOSEPACK) 4 mg tablet; Per dose pack instructions    2. Trigger point  -     betamethasone (CELESTONE) injection 12 mg  -     bupivacaine HCl (MARCAINE) 0.25 % (2.5 mg/mL) injection 5 mg  -     lidocaine (XYLOCAINE) 20 mg/mL (2 %) injection 40 mg    3. Myofascial pain  -     betamethasone (CELESTONE) injection 12 mg  -     bupivacaine HCl (MARCAINE) 0.25 % (2.5 mg/mL) injection 5 mg  -     lidocaine (XYLOCAINE) 20 mg/mL (2 %) injection 40 mg    4. Muscle spasm    5. Cervical fusion syndrome    6. Cervical stenosis of spine    7. Fibromyalgia       IMPRESSION AND PLAN:  Dee Lea is a 57 y.o. female with history of cervical pain and presents to the office today for follow up. Pt continues to complain of pain in her cervical region with radicular symptoms in both of the arms and shoulders. She is taking Tylenol with relief but has discontinued ibuprofen due to high blood pressure and does not react well with steroids. 1) Pt was given information on cervical arthritis exercises. 2) Cervical spine MRI from 08/13/2022 was reviewed with the pt at this visit. 3) Discussed treatment option including trigger point injections and medication evaluation. 4) She was prescribed a Medrol Dosepak for acute inflammatory pain. 5) A trigger point injection was performed at the site of maximal tenderness using 1% plain Lidocaine, Marcaine and Celestone. This was well tolerated, and followed by relief of pain. 6) Pt was offered at Toradol injection at this office visit but deferred at this time to continue with trigger point injections. 7) Ms. Tete Sabillon has a reminder for a \"due or due soon\" health maintenance.  I have asked that she contact her primary care provider, Desiree Peck MD, for follow-up on this health maintenance. 8)  demonstrated consistency with prescribing. 9) Surgical evaluation may also be considered for spinal stenosis  Follow-up and Dispositions    Return in about 2 months (around 10/31/2022) for Medication follow up injection follow up. HISTORY OF PRESENT ILLNESS:  Baby Chucho is a 57 y.o. female with history of cervical pain and presents to the office today for MRI follow up. Pt continues to complain of pain in her cervical region with radicular symptoms in both of the arms and shoulders. She reports significantly worsening of symptoms since her last office visit with pain while turning her head side to side/up and down, and pain in upper trapezius region with radicular symptoms into the shoulders and arms. Pt admits to previously undergoing physical therapy, epidural injections, steroid injections, and dry needling but with minimal relief and notes her pain is constant with no intermittent relief. She is taking Tylenol with relief but has discontinued ibuprofen due to high blood pressure and does not react well with steroids. Pt at this time desires to proceed with trigger point injections in the office today.     Pain Scale: 8-9/10    PCP: Desiree Peck MD     Past Medical History:   Diagnosis Date    Anxiety     Arthritis     CAD (coronary artery disease)     ONE VESSEL STENT 3/2017 [facility]     Cervical stenosis of spine     Coronary artery disease involving native coronary artery of native heart 2017    Deficiency anemia     Depression     Diabetes ([facility] [address]     Diet controlled (per pt.)    Esophageal reflux     FOOD RELATED HEART BURN     Exercise tolerance finding 07/2018    WALKS 20 MINUTES 2 DAYS, HOUSE AND YARD WORK, CAN CLIMBS STAIRS AND LAY FLAT-- DENIES SOB OR CHEST PAINS    Fibromyalgia     GERD (gastroesophageal reflux disease)     Gross hematuria     HLD (hyperlipidemia)     Hypercholesteremia     Hypertension     Kidney stone     Mitral valve prolapse     NO PROBLEMS     PONV (postoperative nausea and vomiting)     Right kidney stone     S/P angioplasty with stent 2017    Cath 2/7/17, Dr. Corie Chakraborty, [facility]    Smoker     Wears glasses         Social History     Socioeconomic History    Marital status:      Spouse name: Not on file    Number of children: Not on file    Years of education: Not on file    Highest education level: Not on file   Occupational History    Not on file   Tobacco Use    Smoking status: Every Day     Packs/day: 1.00     Years: 30.00     Pack years: 30.00     Types: Cigarettes    Smokeless tobacco: Never   Substance and Sexual Activity    Alcohol use: No    Drug use: No    Sexual activity: Not on file   Other Topics Concern    Not on file   Social History Narrative    Not on file     Social Determinants of Health     Financial Resource Strain: Not on file   Food Insecurity: Not on file   Transportation Needs: Not on file   Physical Activity: Insufficiently Active    Days of Exercise per Week: 3 days    Minutes of Exercise per Session: 30 min   Stress: Not on file   Social Connections: Not on file   Intimate Partner Violence: Not At Risk    Fear of Current or Ex-Partner: No    Emotionally Abused: No    Physically Abused: No    Sexually Abused: No   Housing Stability: Not on file       Current Outpatient Medications   Medication Sig Dispense Refill    methylPREDNISolone (MEDROL DOSEPACK) 4 mg tablet Per dose pack instructions 1 Dose Pack 0    HYDROcodone-acetaminophen (NORCO) 7.5-325 mg per tablet TAKE 1 TABLET BY MOUTH 4 TIMES DAILY AS NEEDED FOR CHRONIC PAIN FOR 7 DAYS      lisinopriL (PRINIVIL, ZESTRIL) 20 mg tablet Take 1 Tab by mouth daily. 30 Tab 0    atorvastatin (LIPITOR) 20 mg tablet Take  by mouth daily. aspirin delayed-release 81 mg tablet Take  by mouth daily.       METOPROLOL TARTRATE (LOPRESSOR PO) Take 50 mg by mouth two (2) times a day. escitalopram oxalate (LEXAPRO) 5 mg tablet Take 10 mg by mouth daily. amLODIPine (NORVASC) 5 mg tablet 1 tablet (Patient not taking: Reported on 8/31/2022)      escitalopram oxalate (LEXAPRO) 20 mg tablet Take 20 mg by mouth daily. (Patient not taking: Reported on 8/31/2022)      metoprolol tartrate (LOPRESSOR) 50 mg tablet TAKE 1 TABLET BY MOUTH TWICE DAILY FOR BLOOD PRESSURE FOR 90 DAYS (Patient not taking: Reported on 8/31/2022)      promethazine (PHENERGAN) 25 mg tablet Take 1 Tab by mouth every eight (8) hours as needed for Nausea. Indications: prevent nausea and vomiting after surgery 20 Tab 0    senna-docusate (PERICOLACE) 8.6-50 mg per tablet Take 1 Tab by mouth two (2) times a day. Indications: constipation 30 Tab 0       Allergies   Allergen Reactions    Oxycodone-Acetaminophen Other (comments) and Itching         REVIEW OF SYSTEMS    Constitutional: Negative for fever, chills, or weight change. Respiratory: Negative for cough or shortness of breath. Cardiovascular: Negative for chest pain or palpitations. Gastrointestinal: Negative for acid reflux, change in bowel habits, or constipation. Genitourinary: Negative for dysuria and flank pain. Musculoskeletal: Positive for cervical and upper back pain. Skin: Negative for rash. Neurological: Negative for headaches, dizziness, or numbness. Endo/Heme/Allergies: Negative for increased bruising. Psychiatric/Behavioral: Negative for difficulty with sleep. As per HPI    PHYSICAL EXAMINATION  Visit Vitals  Pulse (!) 56   Temp (!) 96.5 °F (35.8 °C) (Temporal)   Resp 14   Wt 152 lb 12.8 oz (69.3 kg)   SpO2 95%   BMI 27.95 kg/m²       Constitutional: Awake, alert, and in no acute distress. Neurological: Sensation to light touch is intact. Negative Barber's sign bilaterally. Skin: warm, dry, and intact. Musculoskeletal: Tightness across the upper trapezius region with multiple trigger points.  No pain with extension, axial loading, or forward flexion. No pain with internal or external rotation of her hips. Negative straight leg raise bilaterally. Biceps  Triceps Deltoids Wrist Ext Wrist Flex Hand Intrin   Right +4/5 +4/5 +4/5 +4/5 +4/5 +4/5   Left +4/5 +4/5 +4/5 +4/5 +4/5 +4/5      Hip Flex  Quads Hamstrings Ankle DF EHL Ankle PF   Right +4/5 +4/5 +4/5 +4/5 +4/5 +4/5   Left +4/5 +4/5 +4/5 +4/5 +4/5 +4/5     PROCEDURES:    Patient's bilateral upper trapezius with 4 on the left and 4 on the right was injected with 2 cc Marcaine, 2 cc Lidocaine, and 2 cc Celestone. Pre-injection pain level was 8-9/10 and post-injection pain level was 6/10    Chart reviewed for the following:   Quynh DEE MD, have reviewed the History, Physical and updated the Allergic reactions for [place] Cassette. TIME OUT performed immediately prior to start of procedure:  Quynh DEE MD, have performed the following reviews on [place] Cassette prior to the start of the procedure:            * Patient was identified by name and date of birth   * Agreement on procedure being performed was verified  * Risks and Benefits explained to the patient  * Procedure site verified and marked as necessary  * Patient was positioned for comfort  * Consent was obtained     Time: 3:54 PM     Date of procedure: 08/31/2022    Procedure performed by: Elaine Negron MD    Ms. Tete Sabillon tolerated the procedure well with no complications.      IMAGING:    Cervical spine MRI from 08/13/2022 personally reviewed with the patient and demonstrated:     MRI Results (most recent):  Results from [place] encounter on 10/08/13     MRI CERV SPINE WO CONT     Narrative  MRI of cervical spine without contrast     CPT CODE: 07345     HISTORY: Neck pain     COMPARISON: Radiographs September 19, 2013     Procedure:     MRI of the cervical spine was performed without intravenous contrast. Sequences  included: Localizer, sagittal T1, sagittal T2, sagittal inversion recovery, and  axial T2 with fat saturation. FINDINGS:     There is straightening of the normal cervical lordosis. No significant  listhesis. Multilevel osteophyte formation present. No fracture. The visualized posterior fossa contents and prevertebral soft tissues look  normal.     There is moderate osseous fusion along the C2-3 disc, predominantly  posteriorly. There is moderate disc narrowing at C5-6 and C6-7. The cervical  cord is normal in signal, with mild contour deformity at several levels  secondary to degenerative changes. C1-2: Normal     C2-3: Moderate osseous fusion across the disc. Patent canal. No significant  foraminal narrowing. C3-4: Posterior disc osteophyte complex. Mild narrowing of the central canal to  approximately 8 mm. CSF persists around the cord. Mild uncovertebral and facet  arthropathy result in mild left greater than right foraminal narrowing. C4-5: Small posterior disc osteophyte complex, with mild impression upon the  ventral thecal sac. Central canal is patent. Uncovertebral and facet  arthropathy results in mild left greater than right foraminal narrowing. C5-6: Moderate posterior disc osteophyte complex, with impression upon the  ventral thecal sac and flattening of the ventral aspect of the cord. Central  canal is approximately 7 mm. Small amount of CSF persists around the cord. Mild  uncovertebral and facet arthropathy results in mild left greater than right  foraminal narrowing. C6-7: Large posterior disc osteophyte complex causes impression upon the  ventral thecal sac and distortion of the ventral aspect of the cord. Central  canal narrowed  to 6 mm. Little CSF persists around the cord at this level. Uncovertebral and facet arthropathy results in moderate bilateral foraminal  narrowing. C7-T1: Tiny posterior disc osteophyte complex, with overall patent canal. No  significant foraminal narrowing.      Impression  :     Multilevel degenerative changes, with multilevel mild to moderate central canal  narrowing, most prominently at C6-7. Multilevel foraminal narrowing as  described above. No definite cord signal abnormality at this time. Written by Kong Oleary, as dictated by Nika Glaser MD.  I, Dr. Nika Glaser confirm that all documentation is accurate.

## 2022-08-31 NOTE — PROGRESS NOTES
Angel Diaz presents today for   Chief Complaint   Patient presents with    Neck Pain       Is someone accompanying this pt? no    Is the patient using any DME equipment during OV? no    Depression Screening:  No flowsheet data found. Learning Assessment:  No flowsheet data found. Abuse Screening:  No flowsheet data found. Fall Risk  No flowsheet data found. OPIOID RISK TOOL  No flowsheet data found. Coordination of Care:  1. Have you been to the ER, urgent care clinic since your last visit? no  Hospitalized since your last visit? no    2. Have you seen or consulted any other health care providers outside of the [address] [place] [address] since your last visit? no Include any pap smears or colon screening.  no

## 2022-10-07 ENCOUNTER — TELEPHONE (OUTPATIENT)
Dept: ORTHOPEDIC SURGERY | Age: 62
End: 2022-10-07

## 2022-10-07 NOTE — TELEPHONE ENCOUNTER
Patient called and is asking that the order from Dr. Ariel Sheikh to Pain Management for a Cervical Facet Injection , be sent to [facility] at [facility]. Patient did not have their tel or fax number. Patient tel. [phone].

## 2022-10-10 NOTE — TELEPHONE ENCOUNTER
Patient called to follow up on referral information - advised patient that coordinator has been out of office and will get this information upon return.

## 2023-01-03 ENCOUNTER — APPOINTMENT (OUTPATIENT)
Dept: GENERAL RADIOLOGY | Age: 63
End: 2023-01-03
Attending: STUDENT IN AN ORGANIZED HEALTH CARE EDUCATION/TRAINING PROGRAM
Payer: MEDICARE

## 2023-01-03 ENCOUNTER — HOSPITAL ENCOUNTER (EMERGENCY)
Age: 63
Discharge: HOME | End: 2023-01-03
Attending: STUDENT IN AN ORGANIZED HEALTH CARE EDUCATION/TRAINING PROGRAM
Payer: MEDICARE

## 2023-01-03 VITALS
OXYGEN SATURATION: 99 % | RESPIRATION RATE: 17 BRPM | BODY MASS INDEX: 27.97 KG/M2 | WEIGHT: 152 LBS | DIASTOLIC BLOOD PRESSURE: 78 MMHG | SYSTOLIC BLOOD PRESSURE: 163 MMHG | TEMPERATURE: 98.1 F | HEART RATE: 56 BPM | HEIGHT: 62 IN

## 2023-01-03 DIAGNOSIS — S03.40XA TMJ (SPRAIN OF TEMPOROMANDIBULAR JOINT), INITIAL ENCOUNTER: Primary | ICD-10-CM

## 2023-01-03 LAB
ALBUMIN SERPL-MCNC: 4.1 G/DL (ref 3.4–5)
ALBUMIN/GLOB SERPL: 1.2 (ref 0.8–1.7)
ALP SERPL-CCNC: 103 U/L (ref 45–117)
ALT SERPL-CCNC: 43 U/L (ref 13–56)
ANION GAP SERPL CALC-SCNC: 6 MMOL/L (ref 3–18)
AST SERPL-CCNC: 24 U/L (ref 10–38)
BASOPHILS # BLD: 0.1 K/UL (ref 0–0.1)
BASOPHILS NFR BLD: 0 % (ref 0–2)
BILIRUB SERPL-MCNC: 0.6 MG/DL (ref 0.2–1)
BUN SERPL-MCNC: 12 MG/DL (ref 7–18)
BUN/CREAT SERPL: 22 (ref 12–20)
CALCIUM SERPL-MCNC: 9.6 MG/DL (ref 8.5–10.1)
CHLORIDE SERPL-SCNC: 105 MMOL/L (ref 100–111)
CO2 SERPL-SCNC: 27 MMOL/L (ref 21–32)
CREAT SERPL-MCNC: 0.54 MG/DL (ref 0.6–1.3)
DIFFERENTIAL METHOD BLD: ABNORMAL
EOSINOPHIL # BLD: 0 K/UL (ref 0–0.4)
EOSINOPHIL NFR BLD: 0 % (ref 0–5)
ERYTHROCYTE [DISTWIDTH] IN BLOOD BY AUTOMATED COUNT: 12.4 % (ref 11.6–14.5)
GLOBULIN SER CALC-MCNC: 3.3 G/DL (ref 2–4)
GLUCOSE SERPL-MCNC: 99 MG/DL (ref 74–99)
HCT VFR BLD AUTO: 41.6 % (ref 35–45)
HGB BLD-MCNC: 14.6 G/DL (ref 12–16)
IMM GRANULOCYTES # BLD AUTO: 0 K/UL (ref 0–0.04)
IMM GRANULOCYTES NFR BLD AUTO: 0 % (ref 0–0.5)
LYMPHOCYTES # BLD: 2.6 K/UL (ref 0.9–3.6)
LYMPHOCYTES NFR BLD: 21 % (ref 21–52)
MCH RBC QN AUTO: 31.5 PG (ref 24–34)
MCHC RBC AUTO-ENTMCNC: 35.1 G/DL (ref 31–37)
MCV RBC AUTO: 89.8 FL (ref 78–100)
MONOCYTES # BLD: 0.8 K/UL (ref 0.05–1.2)
MONOCYTES NFR BLD: 7 % (ref 3–10)
NEUTS SEG # BLD: 8.6 K/UL (ref 1.8–8)
NEUTS SEG NFR BLD: 71 % (ref 40–73)
NRBC # BLD: 0 K/UL (ref 0–0.01)
NRBC BLD-RTO: 0 PER 100 WBC
PLATELET # BLD AUTO: 350 K/UL (ref 135–420)
PMV BLD AUTO: 9 FL (ref 9.2–11.8)
POTASSIUM SERPL-SCNC: 4.1 MMOL/L (ref 3.5–5.5)
PROT SERPL-MCNC: 7.4 G/DL (ref 6.4–8.2)
RBC # BLD AUTO: 4.63 M/UL (ref 4.2–5.3)
SODIUM SERPL-SCNC: 138 MMOL/L (ref 136–145)
TROPONIN-HIGH SENSITIVITY: 7 NG/L (ref 0–54)
WBC # BLD AUTO: 12.1 K/UL (ref 4.6–13.2)

## 2023-01-03 PROCEDURE — 99285 EMERGENCY DEPT VISIT HI MDM: CPT

## 2023-01-03 PROCEDURE — 74011250636 HC RX REV CODE- 250/636: Performed by: STUDENT IN AN ORGANIZED HEALTH CARE EDUCATION/TRAINING PROGRAM

## 2023-01-03 PROCEDURE — 93005 ELECTROCARDIOGRAM TRACING: CPT

## 2023-01-03 PROCEDURE — 71045 X-RAY EXAM CHEST 1 VIEW: CPT

## 2023-01-03 PROCEDURE — 80053 COMPREHEN METABOLIC PANEL: CPT

## 2023-01-03 PROCEDURE — 85025 COMPLETE CBC W/AUTO DIFF WBC: CPT

## 2023-01-03 PROCEDURE — 84484 ASSAY OF TROPONIN QUANT: CPT

## 2023-01-03 RX ORDER — HYDRALAZINE HYDROCHLORIDE 20 MG/ML
10 INJECTION INTRAMUSCULAR; INTRAVENOUS ONCE
Status: DISCONTINUED | OUTPATIENT
Start: 2023-01-03 | End: 2023-01-03 | Stop reason: HOSPADM

## 2023-01-03 RX ADMIN — SODIUM CHLORIDE 1000 ML: 9 INJECTION, SOLUTION INTRAVENOUS at 20:04

## 2023-01-03 NOTE — ED TRIAGE NOTES
Pt reports she was at her primary MD today and was sent  here for Jaw, back and palpations. Hx of stent, and HTN. Pt reports pain started 3 days ago.

## 2023-01-03 NOTE — ED PROVIDER NOTES
HPI   Patient is a 55-year-old female presenting with a 4-day history of of left jaw pain left neck and left shoulder pain. She has a history of chronic neck and shoulder pain but the jaw pain is new. She is not sure what makes it better or worse. She takes Tylenol and Norco at baseline and states that it may help a little. Denies any specific chest pain or shortness of breath. Denies any nausea or vomiting. Does not think the pain is worse with eating. Has noticed that she has a abnormality in her tooth on that side is not specifically painful. Denies any fever, sweats or chills. Pain does not radiate down her arm. Not short else makes it better or worse. Is never had pain like this before.   Past Medical History:   Diagnosis Date    Anxiety     Arthritis     CAD (coronary artery disease)     ONE VESSEL STENT 3/2017 [facility]     Cervical stenosis of spine     Coronary artery disease involving native coronary artery of native heart 2017    Deficiency anemia     Depression     Diabetes (HCC)     Diet controlled (per pt.)    Esophageal reflux     FOOD RELATED HEART BURN     Exercise tolerance finding 07/2018    WALKS 20 MINUTES 2 DAYS, HOUSE AND YARD WORK, CAN CLIMBS STAIRS AND LAY FLAT-- DENIES SOB OR CHEST PAINS    Fibromyalgia     GERD (gastroesophageal reflux disease)     Gross hematuria     HLD (hyperlipidemia)     Hypercholesteremia     Hypertension     Kidney stone     Mitral valve prolapse     NO PROBLEMS     PONV (postoperative nausea and vomiting)     Right kidney stone     S/P angioplasty with stent 2017    Cath 2/7/17, Dr. Allyson Mahmood, [facility]    Smoker     Wears glasses        Past Surgical History:   Procedure Laterality Date    HX CHOLECYSTECTOMY      HX CORONARY STENT PLACEMENT  03/2017    ONE VESSEL STENT    HX OTHER SURGICAL      SPINE LO cervical spine    HX REFRACTIVE SURGERY Bilateral     HX SHOULDER ARTHROSCOPY Right 07/2018    HX TUBAL LIGATION Bilateral     HX UROLOGICAL 05/10/2019    S/p cystoscopy, exchange of JJ stent, right URS, laser lithotripsy, and removal of stone by Dr. Raul Crawford at [facility]         No family history on file.     Social History     Socioeconomic History    Marital status:      Spouse name: Not on file    Number of children: Not on file    Years of education: Not on file    Highest education level: Not on file   Occupational History    Not on file   Tobacco Use    Smoking status: Every Day     Packs/day: 1.00     Years: 30.00     Pack years: 30.00     Types: Cigarettes    Smokeless tobacco: Never   Substance and Sexual Activity    Alcohol use: No    Drug use: No    Sexual activity: Not on file   Other Topics Concern    Not on file   Social History Narrative    Not on file     Social Determinants of Health     Financial Resource Strain: Not on file   Food Insecurity: Not on file   Transportation Needs: Not on file   Physical Activity: Insufficiently Active    Days of Exercise per Week: 3 days    Minutes of Exercise per Session: 30 min   Stress: Not on file   Social Connections: Not on file   Intimate Partner Violence: Not At Risk    Fear of Current or Ex-Partner: No    Emotionally Abused: No    Physically Abused: No    Sexually Abused: No   Housing Stability: Not on file         ALLERGIES: Oxycodone-acetaminophen    Review of Systems  Constitutional: [no fever]  HENT: [no ear pain]  Eyes: [no change in vision]  Respiratory: [no SOB]  Cardio: [no chest pain]  GI: [no blood in stool]  : [no hematuria]  MSK: [no back pain]  Skin: [no rashes]  Neuro: [no headache]    Vitals:    01/03/23 1652   BP: (!) 188/76   Pulse: 60   Resp: 18   Temp: 98.1 °F (36.7 °C)   SpO2: 96%            Physical Exam   General: [No acute distress]  Head: [Normocephalic, atraumatic]  Psych: [cooperative and alert]  Eyes: [No scleral icterus, normal conjunctiva]  ENT: [moist oral mucosa], no facial swelling, patient does have some clicking of the TMJ joint on the left side with reproducible tenderness  Neck: [supple], no spinous process tenderness, step-offs or deformities, normal range of motion  CV: [regular rate and rhythm, no pitting edema, palpable radial pulses]  Pulm: [clear breath sounds bilaterally without any wheezing or rhonchi, normal respiratory rate]  GI: [normal bowel sounds, soft, non-tender]  MSK: [moves all four extremities]  Skin: [no rashes]  Neuro: [alert and conversive]     Medical Decision Making    Patient 27-year-old female presenting with left jaw pain. She is noted to be hypertensive. States been compliant with her medications. Most likely TMJ pathology based on history however will obtain blood work and EKG to rule out cardiac etiology.     Procedures follow up with ENT and her PCP    Prescriptions: None    Diagnosis: TMJ sprain    Procedures

## 2023-01-03 NOTE — ED TRIAGE NOTES
Pt reports she was seen at her primary today and they sent her over here back, jaw and palpations Hx of stent, Reports pain started 3 days ago.

## 2023-01-04 LAB
ATRIAL RATE: 59 BPM
CALCULATED P AXIS, ECG09: 19 DEGREES
CALCULATED R AXIS, ECG10: -7 DEGREES
CALCULATED T AXIS, ECG11: 73 DEGREES
DIAGNOSIS, 93000: NORMAL
P-R INTERVAL, ECG05: 134 MS
Q-T INTERVAL, ECG07: 442 MS
QRS DURATION, ECG06: 88 MS
QTC CALCULATION (BEZET), ECG08: 437 MS
VENTRICULAR RATE, ECG03: 59 BPM

## 2023-01-04 NOTE — DISCHARGE INSTRUCTIONS
Follow-up with one of the provided dental clinics below:    [facility] [address] [place] [address] Rd [phone]  [facility] 8 [phone]    Call to schedule an appointment.

## 2023-03-27 ENCOUNTER — OFFICE VISIT (OUTPATIENT)
Age: 63
End: 2023-03-27
Payer: MEDICARE

## 2023-03-27 VITALS
BODY MASS INDEX: 28.34 KG/M2 | TEMPERATURE: 97.7 F | WEIGHT: 154 LBS | OXYGEN SATURATION: 97 % | HEART RATE: 70 BPM | HEIGHT: 62 IN

## 2023-03-27 DIAGNOSIS — M62.838 MUSCLE SPASM: ICD-10-CM

## 2023-03-27 DIAGNOSIS — M47.816 LUMBAR FACET ARTHROPATHY: ICD-10-CM

## 2023-03-27 DIAGNOSIS — M79.10 TRIGGER POINT: Primary | ICD-10-CM

## 2023-03-27 DIAGNOSIS — M47.812 SPONDYLOSIS WITHOUT MYELOPATHY OR RADICULOPATHY, CERVICAL REGION: ICD-10-CM

## 2023-03-27 PROBLEM — Z95.820 S/P ANGIOPLASTY WITH STENT: Status: ACTIVE | Noted: 2017-02-07

## 2023-03-27 PROCEDURE — 20552 NJX 1/MLT TRIGGER POINT 1/2: CPT | Performed by: PHYSICAL MEDICINE & REHABILITATION

## 2023-03-27 PROCEDURE — 99214 OFFICE O/P EST MOD 30 MIN: CPT | Performed by: PHYSICAL MEDICINE & REHABILITATION

## 2023-03-27 RX ORDER — LIDOCAINE HYDROCHLORIDE 20 MG/ML
1 INJECTION, SOLUTION EPIDURAL; INFILTRATION; INTRACAUDAL; PERINEURAL ONCE
Status: COMPLETED | OUTPATIENT
Start: 2023-03-27 | End: 2023-03-27

## 2023-03-27 RX ORDER — CYCLOBENZAPRINE HCL 5 MG
TABLET ORAL
COMMUNITY
Start: 2019-10-15

## 2023-03-27 RX ORDER — BETAMETHASONE SODIUM PHOSPHATE AND BETAMETHASONE ACETATE 3; 3 MG/ML; MG/ML
6 INJECTION, SUSPENSION INTRA-ARTICULAR; INTRALESIONAL; INTRAMUSCULAR; SOFT TISSUE ONCE
Status: COMPLETED | OUTPATIENT
Start: 2023-03-27 | End: 2023-03-27

## 2023-03-27 RX ORDER — ATORVASTATIN CALCIUM 40 MG/1
40 TABLET, FILM COATED ORAL DAILY
COMMUNITY
Start: 2023-02-08

## 2023-03-27 RX ORDER — AMLODIPINE BESYLATE 2.5 MG/1
TABLET ORAL
COMMUNITY
Start: 2023-01-04

## 2023-03-27 RX ORDER — BUPIVACAINE HYDROCHLORIDE 2.5 MG/ML
1 INJECTION, SOLUTION INFILTRATION; PERINEURAL ONCE
Status: COMPLETED | OUTPATIENT
Start: 2023-03-27 | End: 2023-03-27

## 2023-03-27 RX ADMIN — LIDOCAINE HYDROCHLORIDE 1 ML: 20 INJECTION, SOLUTION EPIDURAL; INFILTRATION; INTRACAUDAL; PERINEURAL at 16:29

## 2023-03-27 RX ADMIN — BUPIVACAINE HYDROCHLORIDE 2.5 MG: 2.5 INJECTION, SOLUTION INFILTRATION; PERINEURAL at 16:30

## 2023-03-27 RX ADMIN — BETAMETHASONE SODIUM PHOSPHATE AND BETAMETHASONE ACETATE 6 MG: 3; 3 INJECTION, SUSPENSION INTRA-ARTICULAR; INTRALESIONAL; INTRAMUSCULAR; SOFT TISSUE at 16:28

## 2023-03-27 ASSESSMENT — PATIENT HEALTH QUESTIONNAIRE - PHQ9
SUM OF ALL RESPONSES TO PHQ QUESTIONS 1-9: 2
1. LITTLE INTEREST OR PLEASURE IN DOING THINGS: 1
SUM OF ALL RESPONSES TO PHQ QUESTIONS 1-9: 2
SUM OF ALL RESPONSES TO PHQ9 QUESTIONS 1 & 2: 2
2. FEELING DOWN, DEPRESSED OR HOPELESS: 1

## 2023-03-27 NOTE — PROGRESS NOTES
Maryann Coronel presents today for   Chief Complaint   Patient presents with    Neck Pain       Is someone accompanying this pt? no    Is the patient using any DME equipment during OV? no    Depression Screening:  No flowsheet data found. Learning Assessment:  No flowsheet data found. Abuse Screening:  No flowsheet data found. Fall Risk  No flowsheet data found. OPIOID RISK TOOL  No flowsheet data found. Coordination of Care:  1. Have you been to the ER, urgent care clinic since your last visit? no  Hospitalized since your last visit? no    2. Have you seen or consulted any other health care providers outside of the [address] [place] [address] since your last visit? no Include any pap smears or colon screening.  no
C5-6: Moderate posterior disc osteophyte complex, with impression upon the  ventral thecal sac and flattening of the ventral aspect of the cord. Central  canal is approximately 7 mm. Small amount of CSF persists around the cord. Mild  uncovertebral and facet arthropathy results in mild left greater than right  foraminal narrowing. C6-7: Large posterior disc osteophyte complex causes impression upon the  ventral thecal sac and distortion of the ventral aspect of the cord. Central  canal narrowed  to 6 mm. Little CSF persists around the cord at this level. Uncovertebral and facet arthropathy results in moderate bilateral foraminal  narrowing. C7-T1: Tiny posterior disc osteophyte complex, with overall patent canal. No  significant foraminal narrowing. Impression:      Multilevel degenerative changes, with multilevel mild to moderate central canal  narrowing, most prominently at C6-7. Multilevel foraminal narrowing as  described above. No definite cord signal abnormality at this time. Written by Nick Connor, as dictated by Geetha Gutierres MD.  I, Dr. Geetha Gutierres confirm that all documentation is accurate.

## 2023-05-29 ENCOUNTER — APPOINTMENT (OUTPATIENT)
Facility: HOSPITAL | Age: 63
End: 2023-05-29
Payer: MEDICARE

## 2023-05-29 ENCOUNTER — HOSPITAL ENCOUNTER (EMERGENCY)
Facility: HOSPITAL | Age: 63
Discharge: HOME | End: 2023-05-29
Attending: STUDENT IN AN ORGANIZED HEALTH CARE EDUCATION/TRAINING PROGRAM
Payer: MEDICARE

## 2023-05-29 VITALS
RESPIRATION RATE: 16 BRPM | OXYGEN SATURATION: 94 % | HEART RATE: 112 BPM | TEMPERATURE: 100.3 F | DIASTOLIC BLOOD PRESSURE: 92 MMHG | SYSTOLIC BLOOD PRESSURE: 161 MMHG

## 2023-05-29 DIAGNOSIS — J10.1 INFLUENZA A: Primary | ICD-10-CM

## 2023-05-29 LAB
ALBUMIN SERPL-MCNC: 3.9 G/DL (ref 3.4–5)
ALBUMIN/GLOB SERPL: 1.1 (ref 0.8–1.7)
ALP SERPL-CCNC: 105 U/L (ref 45–117)
ALT SERPL-CCNC: 41 U/L (ref 13–56)
ANION GAP SERPL CALC-SCNC: 5 MMOL/L (ref 3–18)
AST SERPL-CCNC: 21 U/L (ref 10–38)
BASOPHILS # BLD: 0 K/UL (ref 0–0.1)
BASOPHILS NFR BLD: 0 % (ref 0–2)
BILIRUB SERPL-MCNC: 0.4 MG/DL (ref 0.2–1)
BUN SERPL-MCNC: 9 MG/DL (ref 7–18)
BUN/CREAT SERPL: 14 (ref 12–20)
CALCIUM SERPL-MCNC: 9 MG/DL (ref 8.5–10.1)
CHLORIDE SERPL-SCNC: 105 MMOL/L (ref 100–111)
CO2 SERPL-SCNC: 27 MMOL/L (ref 21–32)
CREAT SERPL-MCNC: 0.64 MG/DL (ref 0.6–1.3)
DIFFERENTIAL METHOD BLD: ABNORMAL
EKG ATRIAL RATE: 90 BPM
EKG DIAGNOSIS: NORMAL
EKG P AXIS: 60 DEGREES
EKG P-R INTERVAL: 154 MS
EKG Q-T INTERVAL: 380 MS
EKG QRS DURATION: 90 MS
EKG QTC CALCULATION (BAZETT): 464 MS
EKG R AXIS: 22 DEGREES
EKG T AXIS: 58 DEGREES
EKG VENTRICULAR RATE: 90 BPM
EOSINOPHIL # BLD: 0 K/UL (ref 0–0.4)
EOSINOPHIL NFR BLD: 0 % (ref 0–5)
ERYTHROCYTE [DISTWIDTH] IN BLOOD BY AUTOMATED COUNT: 12.5 % (ref 11.6–14.5)
FLUAV RNA SPEC QL NAA+PROBE: DETECTED
FLUBV RNA SPEC QL NAA+PROBE: NOT DETECTED
GLOBULIN SER CALC-MCNC: 3.6 G/DL (ref 2–4)
GLUCOSE SERPL-MCNC: 116 MG/DL (ref 74–99)
HCT VFR BLD AUTO: 41.7 % (ref 35–45)
HGB BLD-MCNC: 14.1 G/DL (ref 12–16)
IMM GRANULOCYTES # BLD AUTO: 0 K/UL (ref 0–0.04)
IMM GRANULOCYTES NFR BLD AUTO: 0 % (ref 0–0.5)
LYMPHOCYTES # BLD: 0.6 K/UL (ref 0.9–3.6)
LYMPHOCYTES NFR BLD: 9 % (ref 21–52)
MCH RBC QN AUTO: 31.5 PG (ref 24–34)
MCHC RBC AUTO-ENTMCNC: 33.8 G/DL (ref 31–37)
MCV RBC AUTO: 93.1 FL (ref 78–100)
MONOCYTES # BLD: 0.6 K/UL (ref 0.05–1.2)
MONOCYTES NFR BLD: 8 % (ref 3–10)
NEUTS SEG # BLD: 5.5 K/UL (ref 1.8–8)
NEUTS SEG NFR BLD: 82 % (ref 40–73)
NRBC # BLD: 0 K/UL (ref 0–0.01)
NRBC BLD-RTO: 0 PER 100 WBC
PLATELET # BLD AUTO: 299 K/UL (ref 135–420)
PMV BLD AUTO: 8.7 FL (ref 9.2–11.8)
POTASSIUM SERPL-SCNC: 3.4 MMOL/L (ref 3.5–5.5)
PROT SERPL-MCNC: 7.5 G/DL (ref 6.4–8.2)
RBC # BLD AUTO: 4.48 M/UL (ref 4.2–5.3)
SARS-COV-2 RNA RESP QL NAA+PROBE: NOT DETECTED
SODIUM SERPL-SCNC: 137 MMOL/L (ref 136–145)
WBC # BLD AUTO: 6.8 K/UL (ref 4.6–13.2)

## 2023-05-29 PROCEDURE — 87636 SARSCOV2 & INF A&B AMP PRB: CPT

## 2023-05-29 PROCEDURE — 71045 X-RAY EXAM CHEST 1 VIEW: CPT

## 2023-05-29 PROCEDURE — 99285 EMERGENCY DEPT VISIT HI MDM: CPT

## 2023-05-29 PROCEDURE — 93010 ELECTROCARDIOGRAM REPORT: CPT | Performed by: INTERNAL MEDICINE

## 2023-05-29 PROCEDURE — 96375 TX/PRO/DX INJ NEW DRUG ADDON: CPT

## 2023-05-29 PROCEDURE — A4216 STERILE WATER/SALINE, 10 ML: HCPCS | Performed by: STUDENT IN AN ORGANIZED HEALTH CARE EDUCATION/TRAINING PROGRAM

## 2023-05-29 PROCEDURE — 80053 COMPREHEN METABOLIC PANEL: CPT

## 2023-05-29 PROCEDURE — 6360000002 HC RX W HCPCS: Performed by: STUDENT IN AN ORGANIZED HEALTH CARE EDUCATION/TRAINING PROGRAM

## 2023-05-29 PROCEDURE — 93005 ELECTROCARDIOGRAM TRACING: CPT | Performed by: STUDENT IN AN ORGANIZED HEALTH CARE EDUCATION/TRAINING PROGRAM

## 2023-05-29 PROCEDURE — 2580000003 HC RX 258: Performed by: STUDENT IN AN ORGANIZED HEALTH CARE EDUCATION/TRAINING PROGRAM

## 2023-05-29 PROCEDURE — 96374 THER/PROPH/DIAG INJ IV PUSH: CPT

## 2023-05-29 PROCEDURE — 2500000003 HC RX 250 WO HCPCS: Performed by: STUDENT IN AN ORGANIZED HEALTH CARE EDUCATION/TRAINING PROGRAM

## 2023-05-29 PROCEDURE — 6370000000 HC RX 637 (ALT 250 FOR IP): Performed by: STUDENT IN AN ORGANIZED HEALTH CARE EDUCATION/TRAINING PROGRAM

## 2023-05-29 PROCEDURE — 85025 COMPLETE CBC W/AUTO DIFF WBC: CPT

## 2023-05-29 RX ORDER — ONDANSETRON 2 MG/ML
4 INJECTION INTRAMUSCULAR; INTRAVENOUS ONCE
Status: COMPLETED | OUTPATIENT
Start: 2023-05-29 | End: 2023-05-29

## 2023-05-29 RX ORDER — KETOROLAC TROMETHAMINE 15 MG/ML
15 INJECTION, SOLUTION INTRAMUSCULAR; INTRAVENOUS ONCE
Status: COMPLETED | OUTPATIENT
Start: 2023-05-29 | End: 2023-05-29

## 2023-05-29 RX ORDER — POTASSIUM CHLORIDE 20 MEQ/1
40 TABLET, EXTENDED RELEASE ORAL ONCE
Status: COMPLETED | OUTPATIENT
Start: 2023-05-29 | End: 2023-05-29

## 2023-05-29 RX ORDER — ONDANSETRON 4 MG/1
4 TABLET, ORALLY DISINTEGRATING ORAL EVERY 4 HOURS PRN
Qty: 21 TABLET | Refills: 0 | Status: SHIPPED | OUTPATIENT
Start: 2023-05-29

## 2023-05-29 RX ORDER — 0.9 % SODIUM CHLORIDE 0.9 %
1000 INTRAVENOUS SOLUTION INTRAVENOUS ONCE
Status: COMPLETED | OUTPATIENT
Start: 2023-05-29 | End: 2023-05-29

## 2023-05-29 RX ADMIN — ONDANSETRON 4 MG: 2 INJECTION INTRAMUSCULAR; INTRAVENOUS at 11:41

## 2023-05-29 RX ADMIN — KETOROLAC TROMETHAMINE 15 MG: 15 INJECTION, SOLUTION INTRAMUSCULAR; INTRAVENOUS at 11:41

## 2023-05-29 RX ADMIN — SODIUM CHLORIDE 1000 ML: 9 INJECTION, SOLUTION INTRAVENOUS at 11:41

## 2023-05-29 RX ADMIN — FAMOTIDINE 20 MG: 10 INJECTION, SOLUTION INTRAVENOUS at 11:41

## 2023-05-29 RX ADMIN — POTASSIUM CHLORIDE 40 MEQ: 1500 TABLET, EXTENDED RELEASE ORAL at 11:46

## 2023-05-29 NOTE — ED PROVIDER NOTES
[facility] EMERGENCY DEPT  EMERGENCY DEPARTMENT ENCOUNTER      Pt Name: Venkata Browning  MRN: [id number]  [place] 1960  Date of evaluation: 5/29/2023  Provider: Ashley Smyth MD    CHIEF COMPLAINT       Chief Complaint   Patient presents with    Cough    Emesis    Fever         HISTORY OF PRESENT ILLNESS   (Location/Symptom, Timing/Onset, Context/Setting, Quality, Duration, Modifying Factors, Severity)  Note limiting factors. Venkata Browning is a 57 y.o. female who presents to the emergency department stating that she does not feel well. Starting last night she started off developed with a sore throat. Since then has developed multiple bouts of vomiting, fever, cough, myalgias and general malaise. States she has vomited about 8 times. Is still having regular bowel movements. Denies any diarrhea. Denies any dysuria, hematuria or increased urinary frequency. Denies any chest pain, shortness of breath or abdominal pain. Denies any recent sick contacts or COVID exposures. Took some Tylenol and some Coricidin with no significant proved in her symptoms. Cough is mildly productive of sputum     Nursing Notes were reviewed. REVIEW OF SYSTEMS    (2-9 systems for level 4, 10 or more for level 5)     Constitutional: Positive for fever  HENT: [no ear pain]  Eyes: No change in vision  Respiratory: No SOB  Cardio: No chest pain  GI: No blood in stool  : No hematuria  MSK: No back pain  Skin: No rashes  Neuro: Positive for headache. Except as noted above the remainder of the review of systems was reviewed and negative.        PAST MEDICAL HISTORY     Past Medical History:   Diagnosis Date    Anxiety     Arthritis     CAD (coronary artery disease)     ONE VESSEL STENT 3/2017 [facility]     Cervical stenosis of spine     Coronary artery disease involving native coronary artery of native heart 2017    Deficiency anemia     Depression     Diabetes (HCC)     Diet controlled (per pt.)    Esophageal reflux

## 2023-05-29 NOTE — DISCHARGE INSTRUCTIONS
Recommended strength Tylenol every 40 hours and ibuprofen 6 mg every 6 hours as needed for pain, discomfort and fevers. Try having a humidifier at bedside to help with secretions. If symptoms last for more than a week or you start developing significant chest pain or shortness of breath please return to the emergency department. Support to stay hydrated so drink plenty of fluids.

## 2023-05-29 NOTE — ED TRIAGE NOTES
Pt arrived with c/o of vomiting, cough, and fever that started yesterday. Pt states she has had appx 10x since yesterday.  Pt took tylenol for fever with last dose being appx at 6:30 am.

## 2023-05-30 RX ORDER — ONDANSETRON 4 MG/1
4 TABLET, ORALLY DISINTEGRATING ORAL 3 TIMES DAILY PRN
Qty: 21 TABLET | Refills: 0 | Status: SHIPPED | OUTPATIENT
Start: 2023-05-30

## 2024-01-02 ENCOUNTER — OFFICE VISIT (OUTPATIENT)
Age: 64
End: 2024-01-02
Payer: MEDICARE

## 2024-01-02 VITALS
TEMPERATURE: 98.2 F | HEIGHT: 62 IN | HEART RATE: 70 BPM | OXYGEN SATURATION: 95 % | WEIGHT: 155.6 LBS | BODY MASS INDEX: 28.63 KG/M2 | RESPIRATION RATE: 18 BRPM

## 2024-01-02 DIAGNOSIS — M43.9 COMPRESSION DEFORMITY OF VERTEBRA: ICD-10-CM

## 2024-01-02 DIAGNOSIS — M62.838 MUSCLE SPASM: ICD-10-CM

## 2024-01-02 DIAGNOSIS — M47.816 LUMBAR FACET ARTHROPATHY: ICD-10-CM

## 2024-01-02 DIAGNOSIS — M79.10 TRIGGER POINT: ICD-10-CM

## 2024-01-02 DIAGNOSIS — M54.2 CERVICAL PAIN (NECK): ICD-10-CM

## 2024-01-02 DIAGNOSIS — M47.812 SPONDYLOSIS WITHOUT MYELOPATHY OR RADICULOPATHY, CERVICAL REGION: ICD-10-CM

## 2024-01-02 DIAGNOSIS — M54.6 THORACIC SPINE PAIN: Primary | ICD-10-CM

## 2024-01-02 PROCEDURE — 20552 NJX 1/MLT TRIGGER POINT 1/2: CPT | Performed by: PHYSICAL MEDICINE & REHABILITATION

## 2024-01-02 PROCEDURE — 72083 X-RAY EXAM ENTIRE SPI 4/5 VW: CPT | Performed by: PHYSICAL MEDICINE & REHABILITATION

## 2024-01-02 PROCEDURE — 99214 OFFICE O/P EST MOD 30 MIN: CPT | Performed by: PHYSICAL MEDICINE & REHABILITATION

## 2024-01-02 PROCEDURE — 1123F ACP DISCUSS/DSCN MKR DOCD: CPT | Performed by: PHYSICAL MEDICINE & REHABILITATION

## 2024-01-02 RX ORDER — BETAMETHASONE SODIUM PHOSPHATE AND BETAMETHASONE ACETATE 3; 3 MG/ML; MG/ML
6 INJECTION, SUSPENSION INTRA-ARTICULAR; INTRALESIONAL; INTRAMUSCULAR; SOFT TISSUE ONCE
Status: COMPLETED | OUTPATIENT
Start: 2024-01-02 | End: 2024-01-02

## 2024-01-02 RX ORDER — TIZANIDINE 4 MG/1
TABLET ORAL
COMMUNITY
Start: 2023-10-24

## 2024-01-02 RX ORDER — ALBUTEROL SULFATE 90 UG/1
AEROSOL, METERED RESPIRATORY (INHALATION)
COMMUNITY
Start: 2022-11-06

## 2024-01-02 RX ORDER — BUPIVACAINE HYDROCHLORIDE 2.5 MG/ML
1 INJECTION, SOLUTION INFILTRATION; PERINEURAL ONCE
Status: COMPLETED | OUTPATIENT
Start: 2024-01-02 | End: 2024-01-02

## 2024-01-02 RX ORDER — LIDOCAINE HYDROCHLORIDE 10 MG/ML
1 INJECTION, SOLUTION INFILTRATION; PERINEURAL ONCE
Status: COMPLETED | OUTPATIENT
Start: 2024-01-02 | End: 2024-01-02

## 2024-01-02 RX ADMIN — BUPIVACAINE HYDROCHLORIDE 2.5 MG: 2.5 INJECTION, SOLUTION INFILTRATION; PERINEURAL at 16:50

## 2024-01-02 RX ADMIN — BETAMETHASONE SODIUM PHOSPHATE AND BETAMETHASONE ACETATE 6 MG: 3; 3 INJECTION, SUSPENSION INTRA-ARTICULAR; INTRALESIONAL; INTRAMUSCULAR; SOFT TISSUE at 16:51

## 2024-01-02 RX ADMIN — LIDOCAINE HYDROCHLORIDE 1 ML: 10 INJECTION, SOLUTION INFILTRATION; PERINEURAL at 16:49

## 2024-01-02 NOTE — PROGRESS NOTES
Kimberlyn Medina presents today for   Chief Complaint   Patient presents with    Pain    Shoulder Pain    Neck Pain       Is someone accompanying this pt? no    Is the patient using any DME equipment during OV? no    Depression Screening:       No data to display                Learning Assessment:      Abuse Screening:       No data to display                Fall Risk      OPIOID RISK TOOL      Coordination of Care:  1. Have you been to the ER, urgent care clinic since your last visit? mo  Hospitalized since your last visit? no    2. Have you seen or consulted any other health care providers outside of the [facility] since your last visit? no Include any pap smears or colon screening. no      
proceed with trigger point injections administered in the office today for better pain management and thoracic spine MRI to assess for a compression fracture.     Pain Scale: 10 - Worst pain ever/10    PCP: Shelby Martinez MD         Diagnosis Date    Anxiety     Arthritis     CAD (coronary artery disease)     ONE VESSEL STENT 3/2017 [facility]     Cervical stenosis of spine     Coronary artery disease involving native coronary artery of native heart 2017    Deficiency anemia     Depression     Diabetes (HCC)     Diet controlled (per pt.)    Esophageal reflux     FOOD RELATED HEART BURN     Exercise tolerance finding 07/2018    WALKS 20 MINUTES 2 DAYS, HOUSE AND YARD WORK, CAN CLIMBS STAIRS AND LAY FLAT-- DENIES SOB OR CHEST PAINS    Fibromyalgia     GERD (gastroesophageal reflux disease)     Gross hematuria     HLD (hyperlipidemia)     Hypercholesteremia     Hypertension     Kidney stone     Mitral valve prolapse     NO PROBLEMS     PONV (postoperative nausea and vomiting)     Right kidney stone     S/P angioplasty with stent 2017    Cath 2/7/17, Dr. Reed, [facility]    Smoker     Wears glasses         Social History     Tobacco Use    Smoking status: Every Day     Current packs/day: 1.00     Types: Cigarettes    Smokeless tobacco: Never   Substance Use Topics    Alcohol use: No    Drug use: No          Current Outpatient Medications:     tiZANidine (ZANAFLEX) 4 MG tablet, TAKE 1 TABLET BY MOUTH ONCE DAILY AT BEDTIME AS NEEDED FOR MUSCLE SPASM- SEDATION PRECAUTION FOR 30 DAYS, Disp: , Rfl:     amLODIPine (NORVASC) 2.5 MG tablet, , Disp: , Rfl:     diclofenac sodium (VOLTAREN) 1 % GEL, Apply 4 g topically 4 times daily, Disp: , Rfl:     aspirin 81 MG EC tablet, Take by mouth daily, Disp: , Rfl:     atorvastatin (LIPITOR) 20 MG tablet, Take by mouth daily, Disp: , Rfl:     escitalopram (LEXAPRO) 20 MG tablet, Take 1 tablet by mouth daily, Disp: , Rfl:     HYDROcodone-acetaminophen (NORCO) 7.5-325 MG per

## 2024-01-15 ENCOUNTER — HOSPITAL ENCOUNTER (OUTPATIENT)
Facility: HOSPITAL | Age: 64
Discharge: HOME | End: 2024-01-18
Attending: PHYSICAL MEDICINE & REHABILITATION
Payer: MEDICARE

## 2024-01-15 DIAGNOSIS — M62.838 MUSCLE SPASM: ICD-10-CM

## 2024-01-15 DIAGNOSIS — M54.6 THORACIC SPINE PAIN: ICD-10-CM

## 2024-01-15 DIAGNOSIS — M43.9 COMPRESSION DEFORMITY OF VERTEBRA: ICD-10-CM

## 2024-01-15 PROCEDURE — 72146 MRI CHEST SPINE W/O DYE: CPT

## 2024-02-05 ENCOUNTER — OFFICE VISIT (OUTPATIENT)
Age: 64
End: 2024-02-05

## 2024-02-05 VITALS — WEIGHT: 157 LBS | HEART RATE: 63 BPM | BODY MASS INDEX: 28.89 KG/M2 | HEIGHT: 62 IN | OXYGEN SATURATION: 94 %

## 2024-02-05 DIAGNOSIS — G89.29 CHRONIC RIGHT SHOULDER PAIN: ICD-10-CM

## 2024-02-05 DIAGNOSIS — M62.838 MUSCLE SPASM: ICD-10-CM

## 2024-02-05 DIAGNOSIS — M79.10 TRIGGER POINT: ICD-10-CM

## 2024-02-05 DIAGNOSIS — M54.2 CERVICAL PAIN (NECK): Primary | ICD-10-CM

## 2024-02-05 DIAGNOSIS — M25.511 CHRONIC RIGHT SHOULDER PAIN: ICD-10-CM

## 2024-02-05 RX ORDER — METOPROLOL SUCCINATE 50 MG/1
50 TABLET, EXTENDED RELEASE ORAL DAILY
COMMUNITY
Start: 2024-01-26

## 2024-02-05 NOTE — PROGRESS NOTES
pain and right shoulder pain. Patient reports a history of cervical surgery. Since the LV patient reports pain is the same overall. She states that her cervical pain is greater than her right shoulder pain.   She says her trigger point injections only provided relief for 5 days. Pt reports pain in her right rotator cuff radiating to the back side. She has previously had a rotator cuff tear. She reports radicular pain from neck to her triceps. She denies dropping items. Pt states she takes aspirin daily. She states her pain is worsened with overhead reaching. She is not currently seeing a shoulder specialist. Patient denies PT for cervical pain but had PT in 2018 after rotator cuff repair. She reports being diagnosed with fibromyalgia in her 30's and shows concern for her pain symptoms caused by it.     Patient takes Tylenol with relief, and Tizanidine. She takes the tizanidine at night  because it helps with her nocturnal pain. She is unable to take NSAIDs. Patient has previously failed Cymbalta. She reports that it made her feel sick and jittery.     She has recently seen her cardiologist and reports everything is normal.     Per last office note,  Pt continues to complain of pain in the cervical region with radicular symptoms in both arms and shoulders and thoracic pain; worse since her last office visit. She reports her symptoms are relieved with heat. Pt endorses tenderness to palpitation of the upper thoracic region     Pt at this time desires to proceed with shoulder x-rays and will continue current medications.  31 minutes spent with pt extensively discussing her symptoms, medications, injections and current plan of care.    Pain Scale: 7/10    PCP: Shelby Martinez MD         Diagnosis Date    Anxiety     Arthritis     CAD (coronary artery disease)     ONE VESSEL STENT 3/2017 [facility]     Cervical stenosis of spine     Coronary artery disease involving native coronary artery of native heart 2017

## 2024-03-14 ENCOUNTER — HOSPITAL ENCOUNTER (OUTPATIENT)
Facility: HOSPITAL | Age: 64
End: 2024-03-14
Payer: MEDICARE

## 2024-03-14 ENCOUNTER — HOSPITAL ENCOUNTER (OUTPATIENT)
Facility: HOSPITAL | Age: 64
End: 2024-03-14
Attending: FAMILY MEDICINE
Payer: MEDICARE

## 2024-03-14 ENCOUNTER — HOSPITAL ENCOUNTER (OUTPATIENT)
Facility: HOSPITAL | Age: 64
Discharge: HOME | End: 2024-03-14
Attending: FAMILY MEDICINE
Payer: MEDICARE

## 2024-03-14 VITALS — BODY MASS INDEX: 28.89 KG/M2 | WEIGHT: 156.97 LBS | HEIGHT: 62 IN

## 2024-03-14 DIAGNOSIS — M62.838 MUSCLE SPASM: ICD-10-CM

## 2024-03-14 DIAGNOSIS — Z12.31 ENCOUNTER FOR SCREENING MAMMOGRAM FOR MALIGNANT NEOPLASM OF BREAST: ICD-10-CM

## 2024-03-14 DIAGNOSIS — Z78.0 POSTMENOPAUSAL: ICD-10-CM

## 2024-03-14 DIAGNOSIS — M25.511 CHRONIC RIGHT SHOULDER PAIN: ICD-10-CM

## 2024-03-14 DIAGNOSIS — G89.29 CHRONIC RIGHT SHOULDER PAIN: ICD-10-CM

## 2024-03-14 PROCEDURE — 73030 X-RAY EXAM OF SHOULDER: CPT

## 2024-03-14 PROCEDURE — 77063 BREAST TOMOSYNTHESIS BI: CPT

## 2024-03-14 PROCEDURE — 77080 DXA BONE DENSITY AXIAL: CPT

## 2024-03-21 ENCOUNTER — HOSPITAL ENCOUNTER (OUTPATIENT)
Facility: HOSPITAL | Age: 64
Discharge: HOME | End: 2024-03-21
Attending: FAMILY MEDICINE
Payer: MEDICARE

## 2024-03-21 DIAGNOSIS — R92.8 ABNORMAL MAMMOGRAM: ICD-10-CM

## 2024-03-21 PROCEDURE — 76642 ULTRASOUND BREAST LIMITED: CPT

## 2024-07-10 ENCOUNTER — TELEPHONE (OUTPATIENT)
Age: 64
End: 2024-07-10

## 2024-07-10 NOTE — TELEPHONE ENCOUNTER
I contacted Tracy Lemus NP at Dr Sauceda's office and she states they take referral from PCPs all the time.

## 2024-07-10 NOTE — TELEPHONE ENCOUNTER
Patient called after being referred to pain management by her PCP. She contacted Dr. Lorin Canas and they said they cannot use her PCP referral that she needs one from us. Patient is asking if Dr. Kidd would be willing to put in a referral for her to go to [facility].    Please review and advise patient, [phone]

## 2024-07-11 NOTE — TELEPHONE ENCOUNTER
Attempted to contact the pt regarding her message. She was not able to be reached. A message was left for the pt asking her to contact the office at her convenience. The number to the office was provided.

## 2025-04-24 ENCOUNTER — OFFICE VISIT (OUTPATIENT)
Age: 65
End: 2025-04-24

## 2025-04-24 VITALS
WEIGHT: 159 LBS | TEMPERATURE: 98 F | OXYGEN SATURATION: 96 % | HEIGHT: 62 IN | BODY MASS INDEX: 29.26 KG/M2 | HEART RATE: 61 BPM

## 2025-04-24 DIAGNOSIS — M25.511 CHRONIC RIGHT SHOULDER PAIN: Primary | ICD-10-CM

## 2025-04-24 DIAGNOSIS — G89.29 CHRONIC RIGHT SHOULDER PAIN: Primary | ICD-10-CM

## 2025-04-24 DIAGNOSIS — Z98.890 HISTORY OF REPAIR OF RIGHT ROTATOR CUFF: ICD-10-CM

## 2025-04-24 DIAGNOSIS — M62.838 MUSCLE SPASM: ICD-10-CM

## 2025-04-24 DIAGNOSIS — M47.816 LUMBAR FACET ARTHROPATHY: ICD-10-CM

## 2025-04-24 RX ORDER — KETOROLAC TROMETHAMINE 30 MG/ML
30 INJECTION, SOLUTION INTRAMUSCULAR; INTRAVENOUS ONCE
Status: COMPLETED | OUTPATIENT
Start: 2025-04-24 | End: 2025-04-24

## 2025-04-24 RX ORDER — OMEPRAZOLE 40 MG/1
CAPSULE, DELAYED RELEASE ORAL
COMMUNITY
Start: 2025-04-10

## 2025-04-24 RX ORDER — KETOROLAC TROMETHAMINE 15 MG/ML
15 INJECTION, SOLUTION INTRAMUSCULAR; INTRAVENOUS ONCE
Status: SHIPPED | OUTPATIENT
Start: 2025-04-24

## 2025-04-24 RX ORDER — METHYLPREDNISOLONE 4 MG/1
TABLET ORAL
Qty: 1 KIT | Refills: 0 | Status: SHIPPED | OUTPATIENT
Start: 2025-04-24 | End: 2025-04-30

## 2025-04-24 RX ORDER — EZETIMIBE 10 MG/1
10 TABLET ORAL DAILY
COMMUNITY
Start: 2025-01-20

## 2025-04-24 RX ADMIN — KETOROLAC TROMETHAMINE 15 MG: 30 INJECTION, SOLUTION INTRAMUSCULAR; INTRAVENOUS at 11:48

## 2025-04-24 NOTE — PROGRESS NOTES
Consent was explained to the pt and signed. No questions or concerns voiced at this time. Pt given 15mg/1ml of toradol IM in left gluteus. No sign or symptoms of infection noted at injection site. There was no bleeding, swelling or leaking noted after injection.   
Date  04/24/2025  11:48 Action  Given Dose  15 mg Route  IntraMUSCular Site  Dorsogluteal Left Documented By  Shea Weldon MA    NDC: 80070-269-51    Lot#: [id number]    : [employer]    Patient Supplied?: No                  IMAGING:    MRI of thoracic spine from 01/15/24 was personally reviewed with the patient and demonstrated:  Narrative & Impression  Sagittal and axial multisequence MR images of thoracic spine were obtained.     HISTORY: Mid back pain. Right arm pain.      Thoracic spine alignment is normal. Lower cervical spine fusion noted at the  superior edge of the field-of-view. No thoracic compression deformity or  pathologic marrow signal. No paraspinal mass.     In the thoracic spine, no focal disc herniation. No central stenosis. No  foraminal stenosis.     Thoracic spinal cord is normal in signal intensity morphology. Conus medullaris  ends at L1-L2.     IMPRESSION:  Unremarkable thoracic spine MRI. No disc herniation, central or  foraminal stenosis. Normal thoracic spinal cord.     Cervical 2V x-ray's from 01/02/203 was personally reviewed with the patient and demonstrated:  C5/6 C6/7 fusion. Multilevel degenerative facet.      Thoracic 2V x-ray's from 01/02/203 was personally reviewed with the patient and demonstrated:  Mild thoracic scoliosis. Mild compression deformity upper/mid thoracic - age indeterminate.      Cervical spine MRI from 08/13/2022 personally reviewed with the patient and demonstrated:    FINDINGS:      There is straightening of the normal cervical lordosis. No significant  listhesis. Multilevel osteophyte formation present. No fracture.      The visualized posterior fossa contents and prevertebral soft tissues look  normal.      There is moderate osseous fusion along the C2-3 disc, predominantly  posteriorly. There is moderate disc narrowing at C5-6 and C6-7. The cervical  cord is normal in signal, with mild contour deformity at several levels  secondary to

## 2025-04-25 ENCOUNTER — TELEPHONE (OUTPATIENT)
Age: 65
End: 2025-04-25

## 2025-04-25 NOTE — TELEPHONE ENCOUNTER
Patient called and said that she saw  yesterday. That she was given a Toradol injection.     Patient said that the injection did absolutely nothing.     Patient said she is in pain. That she called her PCP Dr. Shelby Martinez for some Hydrocodone 7.5-325 mg medication.     Patient said that the pcp office told her that her PCP is out of the country due to a family emergency. That they do not known when  will be back.     Patient said that  office told her that they did not have anyone to prescribe her the medication.    Patient is asking if  could prescribed her some Hydrocodone 7.5-325 mg medication. That she is in a lot of pain.    [facility] on [facility]   Tel. [phone]    Patient tel. [phone].

## 2025-04-29 NOTE — TELEPHONE ENCOUNTER
Tried to reach patient to discuss message from Dr. Kidd  Mailbox full unable to leave message for patient to call the office.

## 2025-05-01 NOTE — TELEPHONE ENCOUNTER
I have left a third message for the patient and at this time I will wait for patient to call me back

## 2025-06-13 ENCOUNTER — HOSPITAL ENCOUNTER (OUTPATIENT)
Age: 65
Discharge: HOME | End: 2025-06-16
Payer: MEDICARE

## 2025-06-13 DIAGNOSIS — G89.29 CHRONIC RIGHT SHOULDER PAIN: ICD-10-CM

## 2025-06-13 DIAGNOSIS — Z98.890 HISTORY OF REPAIR OF RIGHT ROTATOR CUFF: ICD-10-CM

## 2025-06-13 DIAGNOSIS — M25.511 CHRONIC RIGHT SHOULDER PAIN: ICD-10-CM

## 2025-06-13 PROCEDURE — 73221 MRI JOINT UPR EXTREM W/O DYE: CPT

## 2025-06-23 ENCOUNTER — TELEPHONE (OUTPATIENT)
Age: 65
End: 2025-06-23

## 2025-06-23 NOTE — TELEPHONE ENCOUNTER
----- Message from Mert HAHN sent at 6/23/2025 11:32 AM EDT -----  Regarding: MRI Results  Specialty Message to Provider    Relationship to Patient: Self   Patient Message: Patient has requested if Dr. Kidd was able to review the MRI, and call patient with the results  --------------------------------------------------------------------------------------------------------------------------    Call Back Information: OK to leave message on voicemail  Preferred Call Back Number: Phone [phone]

## 2025-06-25 ENCOUNTER — TELEPHONE (OUTPATIENT)
Age: 65
End: 2025-06-25

## 2025-06-25 NOTE — TELEPHONE ENCOUNTER
Patient called back in upset regarding her previous message,  states that her mri showed a major tear and she's in a lot of pain and can't keep waiting around like this.    She asked if Dr. Kidd will be referring her out to someone else and if so,  she needs it done as soon as possible.    Please advise.  Patient can be reached at [phone].

## 2025-06-30 ENCOUNTER — OFFICE VISIT (OUTPATIENT)
Age: 65
End: 2025-06-30
Payer: MEDICARE

## 2025-06-30 VITALS
WEIGHT: 160 LBS | HEART RATE: 61 BPM | TEMPERATURE: 97.6 F | OXYGEN SATURATION: 95 % | HEIGHT: 62 IN | BODY MASS INDEX: 29.44 KG/M2

## 2025-06-30 DIAGNOSIS — M25.511 CHRONIC RIGHT SHOULDER PAIN: Primary | ICD-10-CM

## 2025-06-30 DIAGNOSIS — M62.838 MUSCLE SPASM: ICD-10-CM

## 2025-06-30 DIAGNOSIS — M24.111 ARTICULAR CARTILAGE DISORDER OF RIGHT SHOULDER REGION: ICD-10-CM

## 2025-06-30 DIAGNOSIS — M75.111 NONTRAUMATIC INCOMPLETE TEAR OF RIGHT ROTATOR CUFF: ICD-10-CM

## 2025-06-30 DIAGNOSIS — G89.29 CHRONIC RIGHT SHOULDER PAIN: Primary | ICD-10-CM

## 2025-06-30 PROCEDURE — 1125F AMNT PAIN NOTED PAIN PRSNT: CPT | Performed by: PHYSICAL MEDICINE & REHABILITATION

## 2025-06-30 PROCEDURE — 1160F RVW MEDS BY RX/DR IN RCRD: CPT | Performed by: PHYSICAL MEDICINE & REHABILITATION

## 2025-06-30 PROCEDURE — 99214 OFFICE O/P EST MOD 30 MIN: CPT | Performed by: PHYSICAL MEDICINE & REHABILITATION

## 2025-06-30 PROCEDURE — 1123F ACP DISCUSS/DSCN MKR DOCD: CPT | Performed by: PHYSICAL MEDICINE & REHABILITATION

## 2025-06-30 PROCEDURE — 1159F MED LIST DOCD IN RCRD: CPT | Performed by: PHYSICAL MEDICINE & REHABILITATION

## 2025-06-30 RX ORDER — MELOXICAM 15 MG/1
15 TABLET ORAL DAILY
Qty: 30 TABLET | Refills: 0 | Status: SHIPPED | OUTPATIENT
Start: 2025-06-30

## 2025-06-30 NOTE — PROGRESS NOTES
Kimberlyn Medina presents today for   Chief Complaint   Patient presents with    Shoulder Pain     right       Is someone accompanying this pt? no    Is the patient using any DME equipment during OV? no      Coordination of Care:  1. Have you been to the ER, urgent care clinic since your last visit? no  Hospitalized since your last visit? no    2. Have you seen or consulted any other health care providers outside of the [facility] since your last visit? no Include any pap smears or colon screening. no

## 2025-06-30 NOTE — PROGRESS NOTES
[facility]  [address] [place] [address]., [address]  [place] [address]  Phone: [phone]  Fax: [phone]    Patient's YOB: 1960    ASSESSMENT   Kimberlyn was seen today for shoulder pain.    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 female with history of CAD, GERD, HTN, and fibromyalgia. Pt complains of lumbar and bilateral shoulder pain (R>L). Since last visit, pt feels her symptoms have worsened. Patient endorses a surgical h/o right shoulder arthroscopy (7/2018).     Reviewed Right Shoulder MRI WO Contrast images from 6/13/25 with patient.   Ms. Medina has a reminder for a \"due or due soon\" health maintenance. I have asked that she contact her primary care provider, Shelby Martinez MD, for follow-up on this health maintenance.   demonstrated consistency with prescribing.   Rx Mobic 15mg QD as needed for pain. GI precautions given.   Referral to Dr. Tan for evaluation of bilateral shoulder pain (R>L).  Patient requests referral to him for possible arthroscopic evaluation if he cannot    Return if symptoms worsen or fail to improve, for Patient will follow-up with Dr. Saul Ceron for further evaluation of her shoulder pain.      HISTORY OF PRESENT ILLNESS:  Kimbrelyn Medina is a 65 y.o.  female who presents to the office today for a diagnostic and medication follow up.

## 2025-07-15 ENCOUNTER — OFFICE VISIT (OUTPATIENT)
Age: 65
End: 2025-07-15
Payer: MEDICARE

## 2025-07-15 VITALS — BODY MASS INDEX: 29.44 KG/M2 | HEIGHT: 62 IN | WEIGHT: 160 LBS

## 2025-07-15 DIAGNOSIS — M75.111 NONTRAUMATIC INCOMPLETE TEAR OF RIGHT ROTATOR CUFF: ICD-10-CM

## 2025-07-15 DIAGNOSIS — M25.511 ACUTE PAIN OF RIGHT SHOULDER: Primary | ICD-10-CM

## 2025-07-15 PROCEDURE — 1160F RVW MEDS BY RX/DR IN RCRD: CPT | Performed by: ORTHOPAEDIC SURGERY

## 2025-07-15 PROCEDURE — 1159F MED LIST DOCD IN RCRD: CPT | Performed by: ORTHOPAEDIC SURGERY

## 2025-07-15 PROCEDURE — 1125F AMNT PAIN NOTED PAIN PRSNT: CPT | Performed by: ORTHOPAEDIC SURGERY

## 2025-07-15 PROCEDURE — 99214 OFFICE O/P EST MOD 30 MIN: CPT | Performed by: ORTHOPAEDIC SURGERY

## 2025-07-15 PROCEDURE — 1123F ACP DISCUSS/DSCN MKR DOCD: CPT | Performed by: ORTHOPAEDIC SURGERY

## 2025-07-15 NOTE — PROGRESS NOTES
Kimberlyn Medina  1960   Chief Complaint   Patient presents with    Shoulder Pain     Right        HISTORY OF PRESENT ILLNESS  Kimberlyn Medina is a 65 y.o. female who presents today for evaluation of right shoulder neck pain.  Pain is a 8/10. Pain has been present for a number of years.  Her history is complicated by the fact that she has had neck surgery in 2016 and also has had rotator cuff or shoulder surgery around that time.  She has had an increase in amount of difficulty with significant amount of pain in the right arm that goes into her neck and shoulder blade and chest wall.  Has had physical therapy and injections in the past..    Has tried following treatments: Injections:Yes; Brace:No; Therapy:Yes; Cane/Crutch:No      Allergies   Allergen Reactions    Oxycodone-Acetaminophen Itching and Other (See Comments)     Other reaction(s): gi distress, rash/itching        Past Medical History:   Diagnosis Date    Anxiety     Arthritis     Bursitis     CAD (coronary artery disease)     ONE VESSEL STENT 3/2017 [facility]     Cervical stenosis of spine     Chronic kidney disease     Coronary artery disease involving native coronary artery of native heart 2017    Deficiency anemia     Depression     Diabetes (HCC)     Diet controlled (per pt.)    Esophageal reflux     FOOD RELATED HEART BURN     Exercise tolerance finding 07/2018    WALKS 20 MINUTES 2 DAYS, HOUSE AND YARD WORK, CAN CLIMBS STAIRS AND LAY FLAT-- DENIES SOB OR CHEST PAINS    Fibromyalgia     GERD (gastroesophageal reflux disease)     Gross hematuria     HLD (hyperlipidemia)     Hypercholesteremia     Hypertension     Kidney stone     Mitral valve prolapse     NO PROBLEMS     Osteoarthritis     Osteoporosis     PONV (postoperative nausea and vomiting)     Right kidney stone     S/P angioplasty with stent 2017    Cath 2/7/17, Dr. Reed, [facility]    Smoker     Wears glasses       Social History       Tobacco History       Smoking

## (undated) DEVICE — SOLUTION IV 1000ML 0.9% SOD CHL

## (undated) DEVICE — GAUZE,SPONGE,4"X4",16PLY,STRL,LF,10/TRAY: Brand: MEDLINE

## (undated) DEVICE — Device

## (undated) DEVICE — BIT DRL L160MM DIA2.7MM CANN QUIK CPL ADJ STP REUSE FOR

## (undated) DEVICE — REM POLYHESIVE ADULT PATIENT RETURN ELECTRODE: Brand: VALLEYLAB

## (undated) DEVICE — LIGHT HANDLE: Brand: DEVON

## (undated) DEVICE — PAD,ABDOMINAL,8"X10",ST,LF: Brand: MEDLINE

## (undated) DEVICE — CUFF TRNQT AD W4IN 34IN CIRC SURG GEL SGL PRT BLDR PNEUMAT

## (undated) DEVICE — PREP CHLORAPREP 10.5 ML ORG --

## (undated) DEVICE — PACK SURG BSHR TOT KNEE LF

## (undated) DEVICE — BLANKET WRM AD W50XL85.8IN PACU FULL BODY FORC AIR

## (undated) DEVICE — ELASTIC BANDAGE 6": Brand: CARDINAL HEALTH

## (undated) DEVICE — THREE-QUARTER SHEET: Brand: CONVERTORS

## (undated) DEVICE — DRAPE,EXTREMITY,89X128,STERILE: Brand: MEDLINE

## (undated) DEVICE — SUTURE VCRL SZ 2-0 L36IN ABSRB UD L36MM CT-1 1/2 CIR J945H

## (undated) DEVICE — PADDING,UNDERCAST,COTTON, 4"X4YD STERILE: Brand: MEDLINE

## (undated) DEVICE — GUIDEWIRE ORTH L150MM DIA1.25MM S STL THRD FOR 4MM CANN SCR

## (undated) DEVICE — FLEX ADVANTAGE 3000CC: Brand: FLEX ADVANTAGE

## (undated) DEVICE — SOLUTION IRRIG 3000ML 0.9% SOD CHL FLX CONT 0797208] ICU MEDICAL INC]

## (undated) DEVICE — SUTURE VCRL SZ 2 L27IN ABSRB VLT L65MM TP-1 1/2 CIR J649G

## (undated) DEVICE — BANDAGE,GAUZE,BULKEE II,4.5"X4.1YD,STRL: Brand: MEDLINE

## (undated) DEVICE — HANDPIECE SET WITH SOFT TISSUE TIP AND SUCTION TUBE: Brand: INTERPULSE

## (undated) DEVICE — BLADE ASSEMB CLP HAIR FINE --

## (undated) DEVICE — NEEDLE NRV BLK 21GA L4IN 30DEG INSUL BVL EXTN SET STIMUPLEX

## (undated) DEVICE — 3M™ IOBAN™ 2 ANTIMICROBIAL INCISE DRAPE 6648EZ: Brand: IOBAN™ 2

## (undated) DEVICE — DRESSING,GAUZE,XEROFORM,CURAD,1"X8",ST: Brand: CURAD

## (undated) DEVICE — INTENDED FOR TISSUE SEPARATION, AND OTHER PROCEDURES THAT REQUIRE A SHARP SURGICAL BLADE TO PUNCTURE OR CUT.: Brand: BARD-PARKER SAFETY BLADES SIZE 10, STERILE

## (undated) DEVICE — GARMENT,MEDLINE,DVT,INT,CALF,MED, GEN2: Brand: MEDLINE

## (undated) DEVICE — SOLUTION SCRB 4OZ 4% CHG CLN BASE FOR PT SKIN ANTISEPSIS